# Patient Record
Sex: FEMALE | Race: BLACK OR AFRICAN AMERICAN | NOT HISPANIC OR LATINO | URBAN - METROPOLITAN AREA
[De-identification: names, ages, dates, MRNs, and addresses within clinical notes are randomized per-mention and may not be internally consistent; named-entity substitution may affect disease eponyms.]

---

## 2019-09-30 ENCOUNTER — INPATIENT (INPATIENT)
Facility: HOSPITAL | Age: 24
LOS: 4 days | Discharge: HOME | DRG: 545 | End: 2019-10-05
Attending: INTERNAL MEDICINE | Admitting: INTERNAL MEDICINE
Payer: COMMERCIAL

## 2019-09-30 VITALS
TEMPERATURE: 99 F | RESPIRATION RATE: 18 BRPM | SYSTOLIC BLOOD PRESSURE: 118 MMHG | WEIGHT: 130.95 LBS | HEIGHT: 63 IN | HEART RATE: 100 BPM | OXYGEN SATURATION: 100 % | DIASTOLIC BLOOD PRESSURE: 81 MMHG

## 2019-09-30 DIAGNOSIS — R50.9 FEVER, UNSPECIFIED: ICD-10-CM

## 2019-09-30 LAB
ALBUMIN SERPL ELPH-MCNC: 3.2 G/DL — LOW (ref 3.5–5)
ALP SERPL-CCNC: 61 U/L — SIGNIFICANT CHANGE UP (ref 40–120)
ALT FLD-CCNC: 66 U/L DA — HIGH (ref 10–60)
ANION GAP SERPL CALC-SCNC: 6 MMOL/L — SIGNIFICANT CHANGE UP (ref 5–17)
AST SERPL-CCNC: 122 U/L — HIGH (ref 10–40)
BASOPHILS # BLD AUTO: 0 K/UL — SIGNIFICANT CHANGE UP (ref 0–0.2)
BASOPHILS NFR BLD AUTO: 0 % — SIGNIFICANT CHANGE UP (ref 0–2)
BILIRUB SERPL-MCNC: 0.5 MG/DL — SIGNIFICANT CHANGE UP (ref 0.2–1.2)
BUN SERPL-MCNC: 10 MG/DL — SIGNIFICANT CHANGE UP (ref 7–18)
CALCIUM SERPL-MCNC: 8.9 MG/DL — SIGNIFICANT CHANGE UP (ref 8.4–10.5)
CHLORIDE SERPL-SCNC: 102 MMOL/L — SIGNIFICANT CHANGE UP (ref 96–108)
CO2 SERPL-SCNC: 26 MMOL/L — SIGNIFICANT CHANGE UP (ref 22–31)
CREAT SERPL-MCNC: 0.85 MG/DL — SIGNIFICANT CHANGE UP (ref 0.5–1.3)
EOSINOPHIL # BLD AUTO: 0 K/UL — SIGNIFICANT CHANGE UP (ref 0–0.5)
EOSINOPHIL NFR BLD AUTO: 0 % — SIGNIFICANT CHANGE UP (ref 0–6)
ERYTHROCYTE [SEDIMENTATION RATE] IN BLOOD: 66 MM/HR — HIGH (ref 0–15)
GLUCOSE SERPL-MCNC: 121 MG/DL — HIGH (ref 70–99)
HCG UR QL: NEGATIVE — SIGNIFICANT CHANGE UP
HCT VFR BLD CALC: 30.1 % — LOW (ref 34.5–45)
HGB BLD-MCNC: 9.8 G/DL — LOW (ref 11.5–15.5)
LYMPHOCYTES # BLD AUTO: 0.62 K/UL — LOW (ref 1–3.3)
LYMPHOCYTES # BLD AUTO: 37 % — SIGNIFICANT CHANGE UP (ref 13–44)
MCHC RBC-ENTMCNC: 28.4 PG — SIGNIFICANT CHANGE UP (ref 27–34)
MCHC RBC-ENTMCNC: 32.6 GM/DL — SIGNIFICANT CHANGE UP (ref 32–36)
MCV RBC AUTO: 87.2 FL — SIGNIFICANT CHANGE UP (ref 80–100)
MONOCYTES # BLD AUTO: 0.05 K/UL — SIGNIFICANT CHANGE UP (ref 0–0.9)
MONOCYTES NFR BLD AUTO: 3 % — SIGNIFICANT CHANGE UP (ref 2–14)
NEUTROPHILS # BLD AUTO: 0.96 K/UL — LOW (ref 1.8–7.4)
NEUTROPHILS NFR BLD AUTO: 56 % — SIGNIFICANT CHANGE UP (ref 43–77)
PLATELET # BLD AUTO: 85 K/UL — LOW (ref 150–400)
POTASSIUM SERPL-MCNC: 4.7 MMOL/L — SIGNIFICANT CHANGE UP (ref 3.5–5.3)
POTASSIUM SERPL-SCNC: 4.7 MMOL/L — SIGNIFICANT CHANGE UP (ref 3.5–5.3)
PROT SERPL-MCNC: 8.7 G/DL — HIGH (ref 6–8.3)
RBC # BLD: 3.45 M/UL — LOW (ref 3.8–5.2)
RBC # FLD: 15.9 % — HIGH (ref 10.3–14.5)
SODIUM SERPL-SCNC: 134 MMOL/L — LOW (ref 135–145)
WBC # BLD: 1.68 K/UL — LOW (ref 3.8–10.5)
WBC # FLD AUTO: 1.68 K/UL — LOW (ref 3.8–10.5)

## 2019-09-30 PROCEDURE — 99223 1ST HOSP IP/OBS HIGH 75: CPT

## 2019-09-30 PROCEDURE — 99285 EMERGENCY DEPT VISIT HI MDM: CPT

## 2019-09-30 PROCEDURE — 71046 X-RAY EXAM CHEST 2 VIEWS: CPT | Mod: 26

## 2019-09-30 PROCEDURE — 70487 CT MAXILLOFACIAL W/DYE: CPT | Mod: 26

## 2019-09-30 PROCEDURE — 70450 CT HEAD/BRAIN W/O DYE: CPT | Mod: 26

## 2019-09-30 RX ORDER — SODIUM CHLORIDE 9 MG/ML
1000 INJECTION INTRAMUSCULAR; INTRAVENOUS; SUBCUTANEOUS ONCE
Refills: 0 | Status: COMPLETED | OUTPATIENT
Start: 2019-09-30 | End: 2019-09-30

## 2019-09-30 RX ADMIN — Medication 100 MILLIGRAM(S): at 17:51

## 2019-09-30 RX ADMIN — SODIUM CHLORIDE 1000 MILLILITER(S): 9 INJECTION INTRAMUSCULAR; INTRAVENOUS; SUBCUTANEOUS at 19:57

## 2019-09-30 RX ADMIN — SODIUM CHLORIDE 1000 MILLILITER(S): 9 INJECTION INTRAMUSCULAR; INTRAVENOUS; SUBCUTANEOUS at 18:57

## 2019-09-30 RX ADMIN — Medication 600 MILLIGRAM(S): at 18:00

## 2019-09-30 NOTE — ED PROVIDER NOTE - OBJECTIVE STATEMENT
24 year old female with Lupus (takes prednisone 10 mg BID) presents to the ED with c/o headache and fever x10 days. Patient states that 5 days ago she was seen by her PCP who gave her Amoxicillin for general care but provided no relief. She then notes that four days ago she saw her dentist and did a clean out of mouth but afterwards has swelling to left side of face. No abd pain, throat pain, ear pain, rash, or any other acute complaints. NKDA.

## 2019-09-30 NOTE — H&P ADULT - HISTORY OF PRESENT ILLNESS
Pt is a  24 year old female with medical history significant for Lupus( On prednisone 10 daily), Neuropsychiatric SLE who came in with fever and headache for 10 days. Pt states that she has been having headache for last 2 weeks, generalized, 8/10 now with some blurring in vision recently. Pt was also having fever upto 104 at home and went to pmd and was started on oral antibiotic. Pt states that she was not feeling better and came to ED. Pt was diagnosed with Lupus in 2014 and was initially on Mycophenolate and hydroxychlorquine that was discontinued for unsure reasons. Pt reports that her blood numbers have been running low, and is following    Lupus (takes prednisone 10 mg BID) presents to the ED with c/o headache and fever x10 days. Patient states that 5 days ago she was seen by her PCP who gave her Amoxicillin for general care but provided no relief. She then notes that four days ago she saw her dentist and did a clean out of mouth but afterwards has swelling to left side of face. No abd pain, throat pain, ear pain, rash, or any other acute complaints. NKDA. Pt is a  24 year old female with medical history significant for Lupus( On prednisone 10 daily), Neuropsychiatric SLE who came in with fever and headache for 10 days. Pt states that she has been having headache for last 2 weeks, generalized, 8/10 now with some blurring in vision recently. Pt was also having fever upto 104 at home and went to pmd and was started on oral antibiotic. Pt states that she was not feeling better and came to ED. Pt was diagnosed with Lupus in 2014 and was initially on Mycophenolate and hydroxychlorquine that was discontinued for unsure reasons. Pt reports that her blood numbers have been running low, and has recently started following Dr Jose Alejandro Taylor. 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    Review os systems + headcahe, vision blurring, nausea, fever, facial pain, fatigue and joint pain.  Denies any chest pain, palpitations, sob, urinary symptoms or bowel symptoms. Pt was in [place] 2 weeks ago and states to have a viral like illness

## 2019-09-30 NOTE — H&P ADULT - PROBLEM SELECTOR PLAN 3
Left sided facial pain and mild swelling with recent instrumentation  No carous teeth of abscess collection/erythema noted  CT negative for abscess collection  Unsure cause of swelling

## 2019-09-30 NOTE — H&P ADULT - ASSESSMENT
Pt is a  24 year old female with medical history significant for Lupus( On prednisone 10 daily), Neuropsychiatric SLE who came in with fever and headache for 10 days. Pt states that she has been having headache for last 2 weeks, generalized, 8/10 now with some blurring in vision recently. Pt was also having fever upto 104 at home and went to pmd and was started on oral antibiotic. Pt states that she was not feeling better and came to ED. Pt was diagnosed with Lupus in 2014 and was initially on Mycophenolate and hydroxychlorquine that was discontinued for unsure reasons. Pt reports that her blood numbers have been running low, and has recently started following Dr Jose Alejandro Taylor. 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    Review os systems + headache vision blurring, nausea, fever, facial pain, fatigue and joint pain.  Denies any chest pain, palpitations, sob, urinary symptoms or bowel symptoms. Pt was in [place] 2 weeks ago and states to have a viral like illness

## 2019-09-30 NOTE — H&P ADULT - PROBLEM SELECTOR PLAN 4
Pt diagnosed with SLE in 2014   Currently on prednisone 10 mg BID  As per brother pt has non complaince issues and has been much complaint with previous treatment  Pt needs more education about her disease and importance of medication complaince  Pt and brother at bedside explained in detail

## 2019-09-30 NOTE — H&P ADULT - ATTENDING COMMENTS
Vital Signs Last 24 Hrs  T(C): 37.3 (30 Sep 2019 20:40), Max: 37.3 (30 Sep 2019 20:40)  T(F): 99.2 (30 Sep 2019 20:40), Max: 99.2 (30 Sep 2019 20:40)  HR: 89 (30 Sep 2019 20:40) (89 - 100)  BP: 102/73 (30 Sep 2019 20:40) (102/73 - 118/81)  RR: 18 (30 Sep 2019 20:40) (18 - 18)  SpO2: 100% (30 Sep 2019 20:40) (100% - 100%) 24 year woman with PMH of SLE ( suboptimally controlled) presenting with 10 days of fevers, headache, mild left facial swelling. NO other symptoms noted on ROS. There is no evidence of left facial abscess or tooth abscess on CT imaging of head and face.  No clinical presentation to suggest meningitis or lupus flare.  Preceded by a viral URI episode about 2 weeks prior    Vital Signs Last 24 Hrs  T(C): 37.3 (30 Sep 2019 20:40), Max: 37.3 (30 Sep 2019 20:40)  T(F): 99.2 (30 Sep 2019 20:40), Max: 99.2 (30 Sep 2019 20:40)  HR: 89 (30 Sep 2019 20:40) (89 - 100)  BP: 102/73 (30 Sep 2019 20:40) (102/73 - 118/81)  RR: 18 (30 Sep 2019 20:40) (18 - 18)  SpO2: 100% (30 Sep 2019 20:40) (100% - 100%)    Exam - unremarkable     Labs                        9.8    1.68  )-----------( 85       ( 30 Sep 2019 17:17 )             30.1     09-30    134  |  102  |  10  ----------------------<  121<H>  4.7   |  26  |  0.85    Ca    8.9      30 Sep 2019 17:17    TPro  8.7<H>  /  Alb  3.2<L>  /  TBili  0.5  /  DBili  x   /  AST  122<H>  /  ALT  66<H>  /  AlkPhos  61  09-30    CT M-F /head  - unremarkable   CXR- WNL    Impression  Fever of unclear etiology  - ? viral syndrome  - ? lupus flare    A/P  Admit to medicine  blood/urine cxs   Hold antibiotics and monitor  ID consult

## 2019-09-30 NOTE — H&P ADULT - PROBLEM SELECTOR PLAN 2
Pt labs signifant for pancytopenia  Pt states she follows hematologist regularly and her steroid was increased to 10BID due to low blood levels, no bone biopsy was done  Please call pt hematologist in am for records

## 2019-09-30 NOTE — H&P ADULT - NSHPPHYSICALEXAM_GEN_ALL_CORE
PHYSICAL EXAM:  GENERAL: NAD, speaks in full sentences, no signs of respiratory distress  HEAD:  Atraumatic, Normocephalic  EYES: EOMI, PERRLA, conjunctiva and sclera clear, mild swelling around left submandibular area  NECK: Supple, No JVD  CHEST/LUNG: Clear to auscultation bilaterally; No wheeze; No crackles; No accessory muscles used  HEART: Regular rate and rhythm; No murmurs;   ABDOMEN: Soft, Nontender, Nondistended; Bowel sounds present; No guarding  EXTREMITIES:  2+ Peripheral Pulses, No cyanosis or edema, small palpable mildly tender LN in left axilla  PSYCH: AAOx3  NEUROLOGY: non-focal  SKIN: No rashes or lesions

## 2019-09-30 NOTE — H&P ADULT - PROBLEM SELECTOR PLAN 1
Pt came in with fever and headache for 10 days  No Focal neurological signs/No meningeal signs noted  Physical exam and basic labs so far negative for source of infection  CT maxillofacial negative for any abscess formation, Xray negative  s/p Clindamycin in ED  F/u blood cultures  ID consult  Holding of antibiotics and f/u ID recommendations

## 2019-09-30 NOTE — ED ADULT NURSE NOTE - OBJECTIVE STATEMENT
AOX4 +ambulatory patient reports L facial swelling x 2 weeks. Patient states she was given abx and finished the course with no relief. Patient also complaining of subj fevers

## 2019-10-01 DIAGNOSIS — E07.9 DISORDER OF THYROID, UNSPECIFIED: ICD-10-CM

## 2019-10-01 DIAGNOSIS — M32.9 SYSTEMIC LUPUS ERYTHEMATOSUS, UNSPECIFIED: ICD-10-CM

## 2019-10-01 DIAGNOSIS — R50.9 FEVER, UNSPECIFIED: ICD-10-CM

## 2019-10-01 DIAGNOSIS — D61.818 OTHER PANCYTOPENIA: ICD-10-CM

## 2019-10-01 DIAGNOSIS — R51 HEADACHE: ICD-10-CM

## 2019-10-01 DIAGNOSIS — Z29.9 ENCOUNTER FOR PROPHYLACTIC MEASURES, UNSPECIFIED: ICD-10-CM

## 2019-10-01 LAB
24R-OH-CALCIDIOL SERPL-MCNC: 31.9 NG/ML — SIGNIFICANT CHANGE UP (ref 30–80)
ALBUMIN SERPL ELPH-MCNC: 2.8 G/DL — LOW (ref 3.5–5)
ALP SERPL-CCNC: 56 U/L — SIGNIFICANT CHANGE UP (ref 40–120)
ALT FLD-CCNC: 53 U/L DA — SIGNIFICANT CHANGE UP (ref 10–60)
ANION GAP SERPL CALC-SCNC: 7 MMOL/L — SIGNIFICANT CHANGE UP (ref 5–17)
APPEARANCE UR: CLEAR — SIGNIFICANT CHANGE UP
AST SERPL-CCNC: 91 U/L — HIGH (ref 10–40)
BASOPHILS # BLD AUTO: SIGNIFICANT CHANGE UP (ref 0–0.2)
BASOPHILS NFR BLD AUTO: SIGNIFICANT CHANGE UP (ref 0–2)
BILIRUB SERPL-MCNC: 0.4 MG/DL — SIGNIFICANT CHANGE UP (ref 0.2–1.2)
BILIRUB UR-MCNC: NEGATIVE — SIGNIFICANT CHANGE UP
BUN SERPL-MCNC: 8 MG/DL — SIGNIFICANT CHANGE UP (ref 7–18)
C3 SERPL-MCNC: 74 MG/DL — LOW (ref 81–157)
C4 SERPL-MCNC: 17 MG/DL — SIGNIFICANT CHANGE UP (ref 13–39)
CALCIUM SERPL-MCNC: 8.6 MG/DL — SIGNIFICANT CHANGE UP (ref 8.4–10.5)
CHLORIDE SERPL-SCNC: 101 MMOL/L — SIGNIFICANT CHANGE UP (ref 96–108)
CO2 SERPL-SCNC: 27 MMOL/L — SIGNIFICANT CHANGE UP (ref 22–31)
COLOR SPEC: YELLOW — SIGNIFICANT CHANGE UP
CREAT SERPL-MCNC: 0.73 MG/DL — SIGNIFICANT CHANGE UP (ref 0.5–1.3)
DIFF PNL FLD: NEGATIVE — SIGNIFICANT CHANGE UP
EOSINOPHIL # BLD AUTO: SIGNIFICANT CHANGE UP (ref 0–0.5)
EOSINOPHIL NFR BLD AUTO: SIGNIFICANT CHANGE UP (ref 0–6)
FLU A RESULT: SIGNIFICANT CHANGE UP
FLU A RESULT: SIGNIFICANT CHANGE UP
FLUAV AG NPH QL: SIGNIFICANT CHANGE UP
FLUBV AG NPH QL: SIGNIFICANT CHANGE UP
GLUCOSE SERPL-MCNC: 106 MG/DL — HIGH (ref 70–99)
GLUCOSE UR QL: NEGATIVE — SIGNIFICANT CHANGE UP
HCT VFR BLD CALC: 28.8 % — LOW (ref 34.5–45)
HGB BLD-MCNC: 9.2 G/DL — LOW (ref 11.5–15.5)
IMM GRANULOCYTES NFR BLD AUTO: SIGNIFICANT CHANGE UP (ref 0–1.5)
KETONES UR-MCNC: NEGATIVE — SIGNIFICANT CHANGE UP
LEGIONELLA AG UR QL: NEGATIVE — SIGNIFICANT CHANGE UP
LEUKOCYTE ESTERASE UR-ACNC: NEGATIVE — SIGNIFICANT CHANGE UP
LYMPHOCYTES # BLD AUTO: SIGNIFICANT CHANGE UP (ref 13–44)
LYMPHOCYTES # BLD AUTO: SIGNIFICANT CHANGE UP (ref 1–3.3)
MAGNESIUM SERPL-MCNC: 1.9 MG/DL — SIGNIFICANT CHANGE UP (ref 1.6–2.6)
MCHC RBC-ENTMCNC: 27.6 PG — SIGNIFICANT CHANGE UP (ref 27–34)
MCHC RBC-ENTMCNC: 31.9 GM/DL — LOW (ref 32–36)
MCV RBC AUTO: 86.5 FL — SIGNIFICANT CHANGE UP (ref 80–100)
MONOCYTES # BLD AUTO: SIGNIFICANT CHANGE UP (ref 0–0.9)
MONOCYTES NFR BLD AUTO: SIGNIFICANT CHANGE UP (ref 2–14)
NEUTROPHILS # BLD AUTO: SIGNIFICANT CHANGE UP (ref 1.8–7.4)
NEUTROPHILS NFR BLD AUTO: SIGNIFICANT CHANGE UP (ref 43–77)
NITRITE UR-MCNC: NEGATIVE — SIGNIFICANT CHANGE UP
NRBC # BLD: 0 /100 WBCS — SIGNIFICANT CHANGE UP (ref 0–0)
PH UR: 7 — SIGNIFICANT CHANGE UP (ref 5–8)
PHOSPHATE SERPL-MCNC: 3.5 MG/DL — SIGNIFICANT CHANGE UP (ref 2.5–4.5)
PLATELET # BLD AUTO: 79 K/UL — LOW (ref 150–400)
POTASSIUM SERPL-MCNC: 3.3 MMOL/L — LOW (ref 3.5–5.3)
POTASSIUM SERPL-SCNC: 3.3 MMOL/L — LOW (ref 3.5–5.3)
PROT SERPL-MCNC: 7.5 G/DL — SIGNIFICANT CHANGE UP (ref 6–8.3)
PROT UR-MCNC: NEGATIVE — SIGNIFICANT CHANGE UP
RBC # BLD: 3.33 M/UL — LOW (ref 3.8–5.2)
RBC # FLD: 15.7 % — HIGH (ref 10.3–14.5)
RSV RESULT: SIGNIFICANT CHANGE UP
RSV RNA RESP QL NAA+PROBE: SIGNIFICANT CHANGE UP
SODIUM SERPL-SCNC: 135 MMOL/L — SIGNIFICANT CHANGE UP (ref 135–145)
SP GR SPEC: 1.01 — SIGNIFICANT CHANGE UP (ref 1.01–1.02)
TSH SERPL-MCNC: 12.7 UU/ML — HIGH (ref 0.34–4.82)
UROBILINOGEN FLD QL: NEGATIVE — SIGNIFICANT CHANGE UP
VIT B12 SERPL-MCNC: 419 PG/ML — SIGNIFICANT CHANGE UP (ref 232–1245)
WBC # BLD: 1.72 K/UL — LOW (ref 3.8–10.5)
WBC # FLD AUTO: 1.72 K/UL — LOW (ref 3.8–10.5)

## 2019-10-01 PROCEDURE — 99232 SBSQ HOSP IP/OBS MODERATE 35: CPT | Mod: GC

## 2019-10-01 PROCEDURE — 70552 MRI BRAIN STEM W/DYE: CPT | Mod: 26

## 2019-10-01 RX ORDER — LANOLIN ALCOHOL/MO/W.PET/CERES
3 CREAM (GRAM) TOPICAL AT BEDTIME
Refills: 0 | Status: COMPLETED | OUTPATIENT
Start: 2019-10-01 | End: 2019-10-01

## 2019-10-01 RX ORDER — INFLUENZA VIRUS VACCINE 15; 15; 15; 15 UG/.5ML; UG/.5ML; UG/.5ML; UG/.5ML
0.5 SUSPENSION INTRAMUSCULAR ONCE
Refills: 0 | Status: DISCONTINUED | OUTPATIENT
Start: 2019-10-01 | End: 2019-10-05

## 2019-10-01 RX ORDER — ACETAMINOPHEN 500 MG
650 TABLET ORAL EVERY 6 HOURS
Refills: 0 | Status: DISCONTINUED | OUTPATIENT
Start: 2019-10-01 | End: 2019-10-05

## 2019-10-01 RX ORDER — CEFTRIAXONE 500 MG/1
1000 INJECTION, POWDER, FOR SOLUTION INTRAMUSCULAR; INTRAVENOUS EVERY 24 HOURS
Refills: 0 | Status: DISCONTINUED | OUTPATIENT
Start: 2019-10-01 | End: 2019-10-05

## 2019-10-01 RX ORDER — POTASSIUM CHLORIDE 20 MEQ
40 PACKET (EA) ORAL ONCE
Refills: 0 | Status: COMPLETED | OUTPATIENT
Start: 2019-10-01 | End: 2019-10-01

## 2019-10-01 RX ORDER — ZOLPIDEM TARTRATE 10 MG/1
5 TABLET ORAL AT BEDTIME
Refills: 0 | Status: DISCONTINUED | OUTPATIENT
Start: 2019-10-01 | End: 2019-10-01

## 2019-10-01 RX ADMIN — Medication 10 MILLIGRAM(S): at 17:32

## 2019-10-01 RX ADMIN — CEFTRIAXONE 100 MILLIGRAM(S): 500 INJECTION, POWDER, FOR SOLUTION INTRAMUSCULAR; INTRAVENOUS at 17:34

## 2019-10-01 RX ADMIN — ZOLPIDEM TARTRATE 5 MILLIGRAM(S): 10 TABLET ORAL at 23:15

## 2019-10-01 RX ADMIN — Medication 10 MILLIGRAM(S): at 11:19

## 2019-10-01 RX ADMIN — Medication 40 MILLIEQUIVALENT(S): at 11:19

## 2019-10-01 RX ADMIN — Medication 650 MILLIGRAM(S): at 04:13

## 2019-10-01 RX ADMIN — Medication 3 MILLIGRAM(S): at 21:44

## 2019-10-01 RX ADMIN — Medication 650 MILLIGRAM(S): at 14:11

## 2019-10-01 RX ADMIN — Medication 650 MILLIGRAM(S): at 02:34

## 2019-10-01 NOTE — PROGRESS NOTE ADULT - PROBLEM SELECTOR PLAN 3
Pt diagnosed with SLE in 2014   Denies history of seziures  Currently on prednisone 10 mg BID  Quit taking immunosuppressive therapy several months ago Pt diagnosed with SLE in 2014   Denies history of seizures  Currently on prednisone 10 mg BID  Quit taking immunosuppressive therapy several months ago Pt diagnosed with SLE in 2014   Seizure history 2 years ago, not on AEDs  Currently on prednisone 10 mg BID  Quit taking immunosuppressive therapy several months ago Pt diagnosed with SLE in 2014   Seizure history 2 years ago, not on AEDs  Currently on prednisone 10 mg BID  Quit taking immunosuppressive therapy several months ago  Ordered MR head to r/o cerebritis

## 2019-10-01 NOTE — CONSULT NOTE ADULT - SUBJECTIVE AND OBJECTIVE BOX
HPI:  ID consult was called to evaluate this patient for fevers and headaches, history of SLE.     As per H&p:  Pt is a  24 year old female with medical history significant for Lupus( On prednisone 10 daily), Neuropsychiatric SLE who came in with fever and headache for 10 days. Pt states that she has been having headache for last 2 weeks, generalized, 8/10 now with some blurring in vision recently. Pt was also having fever upto 104 at home and went to pmd and was started on oral antibiotic. Pt states that she was not feeling better and came to ED. Pt was diagnosed with Lupus in 2014 and was initially on Mycophenolate and hydroxychlorquine that was discontinued for unsure reasons. Pt reports that her blood numbers have been running low, and has recently started following Dr Jose Alejandro Taylor. 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 Review os systems + headcahe, vision blurring, nausea, fever, facial pain, fatigue and joint pain.  Denies any chest pain, palpitations, sob, urinary symptoms or bowel symptoms. Pt was in [place] 2 weeks ago and states to have a viral like illness (30 Sep 2019 23:27)    REVIEW OF SYSTEMS:  [  ] Not able to illicit  General:	  Chest:	  GI:	  :  Skin:	  Musculoskeletal:	  Neuro:    PAST MEDICAL & SURGICAL HISTORY:  Systemic lupus erythematosus    ALLERGIES: No Known Allergies    MEDS:  acetaminophen   Tablet .. 650 milliGRAM(s) Oral every 6 hours PRN  influenza   Vaccine 0.5 milliLiter(s) IntraMuscular once  potassium chloride    Tablet ER 40 milliEquivalent(s) Oral once  predniSONE   Tablet 10 milliGRAM(s) Oral two times a day    SOCIAL HISTORY:  Smoker:  none    FAMILY HISTORY: noncontributory    VITALS:  Vital Signs Last 24 Hrs  T(C): 37.4 (01 Oct 2019 05:54), Max: 38 (01 Oct 2019 02:33)  T(F): 99.4 (01 Oct 2019 05:54), Max: 100.4 (01 Oct 2019 02:33)  HR: 92 (01 Oct 2019 05:54) (89 - 100)  BP: 102/66 (01 Oct 2019 05:54) (100/63 - 118/81)  BP(mean): --  RR: 16 (01 Oct 2019 05:54) (16 - 18)  SpO2: 100% (01 Oct 2019 05:54) (100% - 100%)      PHYSICAL EXAM:  Constitutional:  HEENT:  Neck:  Respiratory:  Cardiovascular:  Gastrointestinal:  Extremities:  Skin:  Ortho:  Neuro:      LABS/DIAGNOSTIC TESTS:                        9.2    1.72  )-----------( 79       ( 01 Oct 2019 08:20 )             28.8     WBC Count: 1.72 K/uL (10-01 @ 08:20)  WBC Count: 1.68 K/uL (09-30 @ 17:17)    10-01    135  |  101  |  8   ----------------------------<  106<H>  3.3<L>   |  27  |  0.73    Ca    8.6      01 Oct 2019 08:20  Phos  3.5     10-01  Mg     1.9     10-01    TPro  7.5  /  Alb  2.8<L>  /  TBili  0.4  /  DBili  x   /  AST  91<H>  /  ALT  53  /  AlkPhos  56  10-01      LIVER FUNCTIONS - ( 01 Oct 2019 08:20 )  Alb: 2.8 g/dL / Pro: 7.5 g/dL / ALK PHOS: 56 U/L / ALT: 53 U/L DA / AST: 91 U/L / GGT: x           Sedimentation Rate, Erythrocyte: 66 mm/Hr (09.30.19 @ 17:17)    Pregnancy Profile, Urine: Negative: There is potential for a false-negative result for very early pregnancies  or with gestational age 5-7 weeks or above. The quantitative measurement  of serum hCG provides the most sensitive and accurate assessment of  pregnancy status. (09.30.19 @ 17:17)        CULTURES:   9/30 BC - pending       RADIOLOGY:  EXAM:  CT MAXILLOFACIAL  IC                            PROCEDURE DATE:  09/30/2019          INTERPRETATION:  CLINICAL INFORMATION: fever headache. . .    TECHNIQUE: Contrast-enhanced CT scan of the maxillofacial bones was   performed. 90 mL Omnipaque 350 intravenous contrast was administered.   Thin section axial images with sagittal and coronal reformations were   obtained.    COMPARISON: None.    FINDINGS:    No rim-enhancing fluid collection.    No acute facial bone fractures are visualized.    The visualized portions of the paranasal sinuses are well aerated.    The visualized orbits are within normal limits.     The visualized calvarium, intracranial contents and skull base are   unremarkable. The soft tissues of the visualized aerodigestive tract are   grossly unremarkable.    IMPRESSION:     No evidence of abscess.          EXAM:  XR CHEST PA LAT 2V                            PROCEDURE DATE:  09/30/2019          INTERPRETATION:  CLINICAL STATEMENT: Fever for 2 weeks    TECHNIQUE: Frontal and lateral views of the chest.    COMPARISON: None.     The cardiomediastinal silhouette is normal and the edward are not enlarged.   The trachea is midline. There is no focal infiltrate or pleural effusion.   The osseous structures are intact.    IMPRESSION:    Normal chest HPI:  ID consult was called to evaluate this patient for fevers and headaches, history of SLE. SLE was diagnosed in 2014, recently started seeing Dr. Blount for rheum. Admits H/As have improved, but still have lingering whitish productive cough. Had low grade fever of 100.4F overnight. Will need MRI of brain and started rocephin.     As per H&p:  Pt is a  24 year old female with medical history significant for Lupus( On prednisone 10 daily), Neuropsychiatric SLE who came in with fever and headache for 10 days. Pt states that she has been having headache for last 2 weeks, generalized, 8/10 now with some blurring in vision recently. Pt was also having fever upto 104 at home and went to pmd and was started on oral antibiotic. Pt states that she was not feeling better and came to ED. Pt was diagnosed with Lupus in 2014 and was initially on Mycophenolate and hydroxychlorquine that was discontinued for unsure reasons. Pt reports that her blood numbers have been running low, and has recently started following Dr Jose Alejandro Taylor. 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 Review os systems + headcahe, vision blurring, nausea, fever, facial pain, fatigue and joint pain.  Denies any chest pain, palpitations, sob, urinary symptoms or bowel symptoms. Pt was in [place] 2 weeks ago and states to have a viral like illness (30 Sep 2019 23:27)    REVIEW OF SYSTEMS:  [  ] Not able to illicit  General: +fevers no malaise   Chest: +cough no sob no CP  GI: no nvd no abdominal pain  : no urinary symptoms   Skin: no rashes no cyanosis  Musculoskeletal: no trauma no LBP  Neuro: +improved ha's no dizziness     PAST MEDICAL & SURGICAL HISTORY:  Systemic lupus erythematosus    ALLERGIES: No Known Allergies    MEDS:  acetaminophen   Tablet .. 650 milliGRAM(s) Oral every 6 hours PRN  influenza   Vaccine 0.5 milliLiter(s) IntraMuscular once  potassium chloride    Tablet ER 40 milliEquivalent(s) Oral once  predniSONE   Tablet 10 milliGRAM(s) Oral two times a day    SOCIAL HISTORY:  Smoker:  none    FAMILY HISTORY: noncontributory    VITALS:  Vital Signs Last 24 Hrs  T(C): 37.4 (01 Oct 2019 05:54), Max: 38 (01 Oct 2019 02:33)  T(F): 99.4 (01 Oct 2019 05:54), Max: 100.4 (01 Oct 2019 02:33)  HR: 92 (01 Oct 2019 05:54) (89 - 100)  BP: 102/66 (01 Oct 2019 05:54) (100/63 - 118/81)  BP(mean): --  RR: 16 (01 Oct 2019 05:54) (16 - 18)  SpO2: 100% (01 Oct 2019 05:54) (100% - 100%)      PHYSICAL EXAM:  Constitutional: well developed young female in NAD  HEENT: normocephalic with moist oral mucosa  Neck: supple no LN's no JVD  Respiratory: lungs clear no rales no rhonchi  Cardiovascular: S1 S2 reg no murmurs  Gastrointestinal: +BS with soft, nondistended abdomen; nontender  Extremities: no edema no cyanosis  Skin: no rashes  Ortho: no jt swelling  Neuro: AAO x 3      LABS/DIAGNOSTIC TESTS:                        9.2    1.72  )-----------( 79       ( 01 Oct 2019 08:20 )             28.8     WBC Count: 1.72 K/uL (10-01 @ 08:20)  WBC Count: 1.68 K/uL (09-30 @ 17:17)    10-01    135  |  101  |  8   ----------------------------<  106<H>  3.3<L>   |  27  |  0.73    Ca    8.6      01 Oct 2019 08:20  Phos  3.5     10-01  Mg     1.9     10-01    TPro  7.5  /  Alb  2.8<L>  /  TBili  0.4  /  DBili  x   /  AST  91<H>  /  ALT  53  /  AlkPhos  56  10-01      LIVER FUNCTIONS - ( 01 Oct 2019 08:20 )  Alb: 2.8 g/dL / Pro: 7.5 g/dL / ALK PHOS: 56 U/L / ALT: 53 U/L DA / AST: 91 U/L / GGT: x           Sedimentation Rate, Erythrocyte: 66 mm/Hr (09.30.19 @ 17:17)    Pregnancy Profile, Urine: Negative: There is potential for a false-negative result for very early pregnancies  or with gestational age 5-7 weeks or above. The quantitative measurement  of serum hCG provides the most sensitive and accurate assessment of  pregnancy status. (09.30.19 @ 17:17)    legionella urine - pending     CULTURES:   9/30 BC - pending   10/01 UC - pending       RADIOLOGY:  EXAM:  CT MAXILLOFACIAL  IC                            PROCEDURE DATE:  09/30/2019          INTERPRETATION:  CLINICAL INFORMATION: fever headache. . .    TECHNIQUE: Contrast-enhanced CT scan of the maxillofacial bones was   performed. 90 mL Omnipaque 350 intravenous contrast was administered.   Thin section axial images with sagittal and coronal reformations were   obtained.    COMPARISON: None.    FINDINGS:    No rim-enhancing fluid collection.    No acute facial bone fractures are visualized.    The visualized portions of the paranasal sinuses are well aerated.    The visualized orbits are within normal limits.     The visualized calvarium, intracranial contents and skull base are   unremarkable. The soft tissues of the visualized aerodigestive tract are   grossly unremarkable.    IMPRESSION:     No evidence of abscess.          EXAM:  XR CHEST PA LAT 2V                            PROCEDURE DATE:  09/30/2019          INTERPRETATION:  CLINICAL STATEMENT: Fever for 2 weeks    TECHNIQUE: Frontal and lateral views of the chest.    COMPARISON: None.     The cardiomediastinal silhouette is normal and the edward are not enlarged.   The trachea is midline. There is no focal infiltrate or pleural effusion.   The osseous structures are intact.    IMPRESSION:    Normal chest

## 2019-10-01 NOTE — PROGRESS NOTE ADULT - PROBLEM SELECTOR PLAN 1
Fever for 2 weeks after recent travel from [place]  Sick contacts exposure   Pt also had dental procedure done one week ago and has been on Augmentin   Mild associated headache and dry cough  F/u Complement levels, HIV and dsDNA for r/o cerebritis   Pending blood cultures and UA Fever for 2 weeks after recent travel from [place]  Sick contacts exposure   Pt also had dental procedure done one week ago and has been on Augmentin   Mild associated headache and dry cough  No meningeal signs   F/u Complement levels, HIV and dsDNA for r/o cerebritis   Pending blood cultures and UA Fever for 2 weeks after recent travel from [place]  Sick contacts exposure   Pt also had dental procedure done one week ago and has been on Augmentin   Mild associated headache and dry cough  No meningeal signs   F/u Complement levels, HIV and dsDNA for r/o cerebritis   Pending blood cultures and UA  Empiric Ceftriaxone until then

## 2019-10-01 NOTE — CONSULT NOTE ADULT - SUBJECTIVE AND OBJECTIVE BOX
Patient is a 24y old  Female who presents with a chief complaint of Fever, headache and facial swelling (01 Oct 2019 09:47)      HPI:  Pt is a  24 year old female with medical history significant for Lupus( On prednisone 10 daily), Neuropsychiatric SLE who came in with fever and headache for 10 days. Pt states that she has been having headache for last 2 weeks, generalized, 8/10 now with some blurring in vision recently. Pt was also having fever upto 104 at home and went to pmd and was started on oral antibiotic. Pt states that she was not feeling better and came to ED. Pt was diagnosed with Lupus in 2014 and was initially on Mycophenolate and hydroxychlorquine that was discontinued for unsure reasons. Pt reports that her blood numbers have been running low, and has recently started following Dr Jose Alejandro Taylor. 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    Review os systems + headcahe, vision blurring, nausea, fever, facial pain, fatigue and joint pain.  Denies any chest pain, palpitations, sob, urinary symptoms or bowel symptoms. Pt was in [place] 2 weeks ago and states to have a viral like illness (30 Sep 2019 23:27)  Her baseline WBC 3-4 and Hb 9.5, platelet 150.       ROS:  Negative except for:    PAST MEDICAL & SURGICAL HISTORY:  Systemic lupus erythematosus      SOCIAL HISTORY:    FAMILY HISTORY:      MEDICATIONS  (STANDING):  cefTRIAXone   IVPB 1000 milliGRAM(s) IV Intermittent every 24 hours  influenza   Vaccine 0.5 milliLiter(s) IntraMuscular once  predniSONE   Tablet 10 milliGRAM(s) Oral two times a day    MEDICATIONS  (PRN):  acetaminophen   Tablet .. 650 milliGRAM(s) Oral every 6 hours PRN Temp greater or equal to 38C (100.4F), Mild Pain (1 - 3)      Allergies    No Known Allergies    Intolerances        Vital Signs Last 24 Hrs  T(C): 37.5 (01 Oct 2019 20:15), Max: 39.3 (01 Oct 2019 13:50)  T(F): 99.5 (01 Oct 2019 20:15), Max: 102.7 (01 Oct 2019 13:50)  HR: 91 (01 Oct 2019 20:15) (91 - 106)  BP: 97/68 (01 Oct 2019 20:15) (90/50 - 112/70)  BP(mean): --  RR: 16 (01 Oct 2019 20:15) (16 - 17)  SpO2: 100% (01 Oct 2019 20:15) (99% - 100%)    PHYSICAL EXAM  General: adult in NAD  HEENT: clear oropharynx, anicteric sclera, pink conjunctiva  Neck: supple  CV: normal S1/S2 with no murmur rubs or gallops  Lungs: positive air movement b/l ant lungs,clear to auscultation, no wheezes, no rales  Abdomen: soft non-tender non-distended, no hepatosplenomegaly  Ext: no clubbing cyanosis or edema  Skin: no rashes and no petechiae  Neuro: alert and oriented X 4, no focal deficits      LABS:                          9.2    1.72  )-----------( 79       ( 01 Oct 2019 08:20 )             28.8         Mean Cell Volume : 86.5 fl  Mean Cell Hemoglobin : 27.6 pg  Mean Cell Hemoglobin Concentration : 31.9 gm/dL  Auto Neutrophil # : See note  Auto Lymphocyte # : See note  Auto Monocyte # : See note  Auto Eosinophil # : See note  Auto Basophil # : See note  Auto Neutrophil % : See note  Auto Lymphocyte % : See note  Auto Monocyte % : See note  Auto Eosinophil % : See note  Auto Basophil % : See note      Serial CBC's  10-01 @ 08:20  Hct-28.8 / Hgb-9.2 / Plat-79 / RBC-3.33 / WBC-1.72  Serial CBC's  09-30 @ 17:17  Hct-30.1 / Hgb-9.8 / Plat-85 / RBC-3.45 / WBC-1.68      10-01    135  |  101  |  8   ----------------------------<  106<H>  3.3<L>   |  27  |  0.73    Ca    8.6      01 Oct 2019 08:20  Phos  3.5     10-01  Mg     1.9     10-01    TPro  7.5  /  Alb  2.8<L>  /  TBili  0.4  /  DBili  x   /  AST  91<H>  /  ALT  53  /  AlkPhos  56  10-01          Vitamin B12, Serum: 419 pg/mL (10-01 @ 14:59)              BLOOD SMEAR INTERPRETATION:       RADIOLOGY & ADDITIONAL STUDIES:    < from: MR Head w/ IV Cont (10.01.19 @ 17:57) >  INTERPRETATION:  MRI brain with and without contrast    History seizures, lupus, psychosis    Contrast Gadavist 7 cc    Comparison CT performed the prior day    There is no mass effect, cortical edema or hydrocephalus. Cortical volume   and white matter signal are preserved. There is no evidence of acute   infarct or previous parenchymal hemorrhage. The orbital and sellar   contents and cerebellar tonsils are within normal limits. There is normal   physiologic enhancement.    IMPRESSION:    Normal brain    < end of copied text >  < from: Xray Chest 2 Views PA/Lat (09.30.19 @ 17:49) >  INTERPRETATION:  CLINICAL STATEMENT: Fever for 2 weeks    TECHNIQUE: Frontal and lateral views of the chest.    COMPARISON: None.     The cardiomediastinal silhouette is normal and the edward are not enlarged.   The trachea is midline. There is no focal infiltrate or pleural effusion.   The osseous structures are intact.    IMPRESSION:    Normal chest    < end of copied text >

## 2019-10-01 NOTE — ED ADULT NURSE REASSESSMENT NOTE - NS ED NURSE REASSESS COMMENT FT1
210 am - informed and asked pt for urine sample  but pt states she can not give sample at this time but will give once specimen ready.

## 2019-10-01 NOTE — PROGRESS NOTE ADULT - SUBJECTIVE AND OBJECTIVE BOX
PGY 3 Note discussed with primary attending    Patient is a 24y old  Female who presents with a chief complaint of Fever, headache and facial swelling (30 Sep 2019 23:27)      INTERVAL HPI/OVERNIGHT EVENTS: offers no new complaints; current symptoms resolving    MEDICATIONS  (STANDING):  influenza   Vaccine 0.5 milliLiter(s) IntraMuscular once  potassium chloride    Tablet ER 40 milliEquivalent(s) Oral once    MEDICATIONS  (PRN):  acetaminophen   Tablet .. 650 milliGRAM(s) Oral every 6 hours PRN Temp greater or equal to 38C (100.4F), Mild Pain (1 - 3)      __________________________________________________  Vital Signs Last 24 Hrs  T(C): 37.4 (01 Oct 2019 05:54), Max: 38 (01 Oct 2019 02:33)  T(F): 99.4 (01 Oct 2019 05:54), Max: 100.4 (01 Oct 2019 02:33)  HR: 92 (01 Oct 2019 05:54) (89 - 100)  BP: 102/66 (01 Oct 2019 05:54) (100/63 - 118/81)  BP(mean): --  RR: 16 (01 Oct 2019 05:54) (16 - 18)  SpO2: 100% (01 Oct 2019 05:54) (100% - 100%)    ________________________________________________  PHYSICAL EXAM:  GENERAL: NAD Young  female   HEENT: Normocephalic;  conjunctivae and sclerae clear; left molar inflammation    NECK : supple  CHEST/LUNG: Clear to auscultation bilaterally with good air entry   HEART: S1 S2  regular; no murmurs, gallops or rubs  ABDOMEN: Soft, Nontender, Nondistended; Bowel sounds present  EXTREMITIES: no cyanosis; no edema; no calf tenderness, tattoos  SKIN: warm and dry; no rash  NERVOUS SYSTEM:  Awake and alert x 3    _________________________________________________  LABS:                        9.2    1.72  )-----------( 79       ( 01 Oct 2019 08:20 )             28.8     10-01    135  |  101  |  8   ----------------------------<  106<H>  3.3<L>   |  27  |  0.73    Ca    8.6      01 Oct 2019 08:20  Phos  3.5     10-01  Mg     1.9     10-01    TPro  7.5  /  Alb  2.8<L>  /  TBili  0.4  /  DBili  x   /  AST  91<H>  /  ALT  53  /  AlkPhos  56  10-01        Plan of care was discussed with patient and /or primary care giver; all questions and concerns were addressed and care was aligned with patient's wishes.

## 2019-10-01 NOTE — CONSULT NOTE ADULT - ASSESSMENT
24 year old lady with known lupus on prednisone 10 mg a day.  she presented with high fever, headache for 2 weeks.  She has some cough, no sore throat, dysuria.  She has pancytopenia now with much lower WBC and platelet.

## 2019-10-01 NOTE — CONSULT NOTE ADULT - ASSESSMENT
Fevers  Pancytopenia  H/o Lupus 1.	Fevers - r/o lupus cerebritis   2.	Pancytopenia  ·	awaiting culture results   ·	started on rocephin 1gm IV daily  ·	ordered DAVID, RF and c-reactive protein  ·	will need MRI brain with contrast

## 2019-10-01 NOTE — PROGRESS NOTE ADULT - PROBLEM SELECTOR PLAN 2
History of chronic pancytopenia   ANC of 950  Pt states she follows hematologist regularly and her steroid was increased to 10BID due to low blood levels, no bone biopsy was done History of chronic pancytopenia   ANC of 950  She saw Dr Dailey one week ago outpatient

## 2019-10-01 NOTE — PROGRESS NOTE ADULT - ASSESSMENT
Pt is a  24 year old female with medical history significant for pancytopenia, Lupus (On prednisone 10 daily), Neuropsychiatric SLE who came in with fever and headache for 10 days.

## 2019-10-01 NOTE — CONSULT NOTE ADULT - PROBLEM SELECTOR RECOMMENDATION 9
no nutritional def as all study was done in office  the drop of counts probably from either infection or SLE  infection is less likley because she has fever and headache for 2 weeks and she looks stable

## 2019-10-02 LAB
ALBUMIN SERPL ELPH-MCNC: 3.2 G/DL — LOW (ref 3.5–5)
ALP SERPL-CCNC: 60 U/L — SIGNIFICANT CHANGE UP (ref 40–120)
ALT FLD-CCNC: 59 U/L DA — SIGNIFICANT CHANGE UP (ref 10–60)
ANION GAP SERPL CALC-SCNC: 9 MMOL/L — SIGNIFICANT CHANGE UP (ref 5–17)
APPEARANCE CSF: CLEAR — SIGNIFICANT CHANGE UP
APPEARANCE SPUN FLD: COLORLESS — SIGNIFICANT CHANGE UP
APTT BLD: 28.2 SEC — SIGNIFICANT CHANGE UP (ref 27.5–36.3)
AST SERPL-CCNC: 93 U/L — HIGH (ref 10–40)
BASOPHILS # BLD AUTO: 0 K/UL — SIGNIFICANT CHANGE UP (ref 0–0.2)
BASOPHILS NFR BLD AUTO: 0 % — SIGNIFICANT CHANGE UP (ref 0–2)
BILIRUB SERPL-MCNC: 0.4 MG/DL — SIGNIFICANT CHANGE UP (ref 0.2–1.2)
BUN SERPL-MCNC: 10 MG/DL — SIGNIFICANT CHANGE UP (ref 7–18)
CALCIUM SERPL-MCNC: 9 MG/DL — SIGNIFICANT CHANGE UP (ref 8.4–10.5)
CHLORIDE SERPL-SCNC: 102 MMOL/L — SIGNIFICANT CHANGE UP (ref 96–108)
CO2 SERPL-SCNC: 26 MMOL/L — SIGNIFICANT CHANGE UP (ref 22–31)
COLOR CSF: SIGNIFICANT CHANGE UP
CREAT SERPL-MCNC: 0.76 MG/DL — SIGNIFICANT CHANGE UP (ref 0.5–1.3)
CRP SERPL-MCNC: 1.19 MG/DL — HIGH (ref 0–0.4)
CSF COMMENTS: SIGNIFICANT CHANGE UP
CULTURE RESULTS: SIGNIFICANT CHANGE UP
DSDNA AB SER-ACNC: 24 IU/ML — SIGNIFICANT CHANGE UP
EOSINOPHIL # BLD AUTO: 0 K/UL — SIGNIFICANT CHANGE UP (ref 0–0.5)
EOSINOPHIL NFR BLD AUTO: 0 % — SIGNIFICANT CHANGE UP (ref 0–6)
GLUCOSE CSF-MCNC: 54 MG/DL — SIGNIFICANT CHANGE UP (ref 40–70)
GLUCOSE SERPL-MCNC: 88 MG/DL — SIGNIFICANT CHANGE UP (ref 70–99)
HAV IGM SER-ACNC: SIGNIFICANT CHANGE UP
HBV CORE IGM SER-ACNC: SIGNIFICANT CHANGE UP
HBV SURFACE AG SER-ACNC: SIGNIFICANT CHANGE UP
HCT VFR BLD CALC: 30.4 % — LOW (ref 34.5–45)
HCV AB S/CO SERPL IA: 0.15 S/CO — SIGNIFICANT CHANGE UP (ref 0–0.99)
HCV AB SERPL-IMP: SIGNIFICANT CHANGE UP
HGB BLD-MCNC: 9.7 G/DL — LOW (ref 11.5–15.5)
HIV 1+2 AB+HIV1 P24 AG SERPL QL IA: SIGNIFICANT CHANGE UP
INR BLD: 0.99 RATIO — SIGNIFICANT CHANGE UP (ref 0.88–1.16)
LYMPHOCYTES # BLD AUTO: 0.73 K/UL — LOW (ref 1–3.3)
LYMPHOCYTES # BLD AUTO: 46 % — HIGH (ref 13–44)
LYMPHOCYTES # CSF: 100 % — HIGH (ref 40–80)
MAGNESIUM SERPL-MCNC: 2.2 MG/DL — SIGNIFICANT CHANGE UP (ref 1.6–2.6)
MCHC RBC-ENTMCNC: 27.2 PG — SIGNIFICANT CHANGE UP (ref 27–34)
MCHC RBC-ENTMCNC: 31.9 GM/DL — LOW (ref 32–36)
MCV RBC AUTO: 85.4 FL — SIGNIFICANT CHANGE UP (ref 80–100)
MONOCYTES # BLD AUTO: 0.13 K/UL — SIGNIFICANT CHANGE UP (ref 0–0.9)
MONOCYTES NFR BLD AUTO: 8 % — SIGNIFICANT CHANGE UP (ref 2–14)
MONOS+MACROS NFR CSF: 0 % — LOW (ref 15–45)
NEUTROPHILS # BLD AUTO: 0.73 K/UL — LOW (ref 1.8–7.4)
NEUTROPHILS # CSF: 0 % — SIGNIFICANT CHANGE UP (ref 0–6)
NEUTROPHILS NFR BLD AUTO: 46 % — SIGNIFICANT CHANGE UP (ref 43–77)
NRBC NFR CSF: 0 % — SIGNIFICANT CHANGE UP (ref 0–0)
NRBC NFR CSF: 2 /UL — SIGNIFICANT CHANGE UP (ref 0–5)
PHOSPHATE SERPL-MCNC: 4.7 MG/DL — HIGH (ref 2.5–4.5)
PLATELET # BLD AUTO: 115 K/UL — LOW (ref 150–400)
POTASSIUM SERPL-MCNC: 3.6 MMOL/L — SIGNIFICANT CHANGE UP (ref 3.5–5.3)
POTASSIUM SERPL-SCNC: 3.6 MMOL/L — SIGNIFICANT CHANGE UP (ref 3.5–5.3)
PROCALCITONIN SERPL-MCNC: 0.13 NG/ML — HIGH (ref 0.02–0.1)
PROCALCITONIN SERPL-MCNC: 0.13 NG/ML — HIGH (ref 0.02–0.1)
PROT CSF-MCNC: 42 MG/DL — SIGNIFICANT CHANGE UP (ref 15–45)
PROT SERPL-MCNC: 8.5 G/DL — HIGH (ref 6–8.3)
PROTHROM AB SERPL-ACNC: 11 SEC — SIGNIFICANT CHANGE UP (ref 10–12.9)
RBC # BLD: 3.56 M/UL — LOW (ref 3.8–5.2)
RBC # CSF: 1 /UL — HIGH (ref 0–0)
RBC # FLD: 15.6 % — HIGH (ref 10.3–14.5)
RHEUMATOID FACT SERPL-ACNC: 12 IU/ML — SIGNIFICANT CHANGE UP (ref 0–13)
SODIUM SERPL-SCNC: 137 MMOL/L — SIGNIFICANT CHANGE UP (ref 135–145)
SPECIMEN SOURCE: SIGNIFICANT CHANGE UP
T3FREE SERPL-MCNC: 1.63 PG/ML — LOW (ref 1.8–4.6)
T4 FREE SERPL-MCNC: 0.8 NG/DL — LOW (ref 0.9–1.8)
TUBE TYPE: SIGNIFICANT CHANGE UP
WBC # BLD: 1.58 K/UL — LOW (ref 3.8–10.5)
WBC # FLD AUTO: 1.58 K/UL — LOW (ref 3.8–10.5)

## 2019-10-02 PROCEDURE — 99232 SBSQ HOSP IP/OBS MODERATE 35: CPT | Mod: GC

## 2019-10-02 PROCEDURE — 88108 CYTOPATH CONCENTRATE TECH: CPT | Mod: 26

## 2019-10-02 RX ORDER — LEVOTHYROXINE SODIUM 125 MCG
100 TABLET ORAL
Refills: 0 | Status: DISCONTINUED | OUTPATIENT
Start: 2019-10-03 | End: 2019-10-03

## 2019-10-02 RX ORDER — SODIUM CHLORIDE 9 MG/ML
1000 INJECTION INTRAMUSCULAR; INTRAVENOUS; SUBCUTANEOUS ONCE
Refills: 0 | Status: COMPLETED | OUTPATIENT
Start: 2019-10-02 | End: 2019-10-02

## 2019-10-02 RX ORDER — ZOLPIDEM TARTRATE 10 MG/1
5 TABLET ORAL AT BEDTIME
Refills: 0 | Status: DISCONTINUED | OUTPATIENT
Start: 2019-10-02 | End: 2019-10-02

## 2019-10-02 RX ORDER — SODIUM CHLORIDE 9 MG/ML
1000 INJECTION, SOLUTION INTRAVENOUS
Refills: 0 | Status: DISCONTINUED | OUTPATIENT
Start: 2019-10-02 | End: 2019-10-03

## 2019-10-02 RX ADMIN — SODIUM CHLORIDE 1000 MILLILITER(S): 9 INJECTION INTRAMUSCULAR; INTRAVENOUS; SUBCUTANEOUS at 11:00

## 2019-10-02 RX ADMIN — Medication 60 MILLIGRAM(S): at 18:22

## 2019-10-02 RX ADMIN — ZOLPIDEM TARTRATE 5 MILLIGRAM(S): 10 TABLET ORAL at 22:45

## 2019-10-02 RX ADMIN — Medication 10 MILLIGRAM(S): at 05:36

## 2019-10-02 RX ADMIN — CEFTRIAXONE 100 MILLIGRAM(S): 500 INJECTION, POWDER, FOR SOLUTION INTRAMUSCULAR; INTRAVENOUS at 18:12

## 2019-10-02 RX ADMIN — SODIUM CHLORIDE 100 MILLILITER(S): 9 INJECTION, SOLUTION INTRAVENOUS at 11:01

## 2019-10-02 RX ADMIN — Medication 60 MILLIGRAM(S): at 18:10

## 2019-10-02 NOTE — CONSULT NOTE ADULT - REASON FOR ADMISSION
Fever, headache and facial swelling

## 2019-10-02 NOTE — PROGRESS NOTE ADULT - SUBJECTIVE AND OBJECTIVE BOX
24y Female is under our care for fevers and headaches, history of SLE. Patient continues to feel better and admits that h/as had resolved. Had another fever spike yesterday afternoon of 102.7F. Mri brain was normal. Though no signs of meninigitis, can go for LP today.      REVIEW OF SYSTEMS:  [  ] Not able to illicit  General: no fevers no malaise  Chest: no cough no sob  GI: no nvd  : no urinary sxs   Skin: no rashes  Musculoskeletal: no trauma no LBP  Neuro: no ha's no dizziness     MEDS:  cefTRIAXone   IVPB 1000 milliGRAM(s) IV Intermittent every 24 hours    ALLERGIES: Allergies    No Known Allergies    Intolerances      VITALS:  Vital Signs Last 24 Hrs  T(C): 37.1 (02 Oct 2019 14:13), Max: 37.6 (01 Oct 2019 17:37)  T(F): 98.7 (02 Oct 2019 14:13), Max: 99.6 (01 Oct 2019 17:37)  HR: 105 (02 Oct 2019 14:13) (91 - 107)  BP: 99/58 (02 Oct 2019 14:13) (94/65 - 99/58)  BP(mean): --  RR: 16 (02 Oct 2019 14:13) (16 - 16)  SpO2: 100% (02 Oct 2019 14:13) (100% - 100%)      PHYSICAL EXAM:  HEENT: n/a  Neck: supple no LN's no JVD  Respiratory: lungs clear no rales no rhonchi  Cardiovascular: S1 S2 reg no murmurs  Gastrointestinal: +BS with soft, nondistended abdomen; nontender  Extremities: no edema no cyanosis  Skin: no rashes  Ortho: no jt swelling  Neuro: AAO x 3 , kernig neg and brudzinski's neg      LABS/DIAGNOSTIC TESTS:                        9.7    1.58  )-----------( 115      ( 02 Oct 2019 06:20 )             30.4     WBC Count: 1.58 K/uL (10-02 @ 06:20)  WBC Count: 1.72 K/uL (10-01 @ 08:20)  WBC Count: 1.68 K/uL (09-30 @ 17:17)    10-02    137  |  102  |  10  ----------------------------<  88  3.6   |  26  |  0.76    Ca    9.0      02 Oct 2019 06:20  Phos  4.7     10-02  Mg     2.2     10-02    TPro  8.5<H>  /  Alb  3.2<L>  /  TBili  0.4  /  DBili  x   /  AST  93<H>  /  ALT  59  /  AlkPhos  60  10-02    Rheumatoid Factor Quant, Serum or Plasma: 12 IU/mL (10.02.19 @ 01:10)    DAVID - pending     Quant B - pending     C-Reactive Protein, Serum (10.02.19 @ 01:10)    C-Reactive Protein, Serum: 1.19 mg/dL    Legionella Antigen, Urine: Negative (10.01.19 @ 14:41)        CULTURES:   .Urine  10-01 @ 14:57   <10,000 CFU/mL Normal Urogenital Iris  --  --      .Blood  10-01 @ 01:06   No growth to date.  --  --      .Blood  10-01 @ 01:04   No growth to date.  --  --        RADIOLOGY:    < from: MR Head w/ IV Cont (10.01.19 @ 17:57) >  XAM:  MR BRAIN IC                            PROCEDURE DATE:  10/01/2019          INTERPRETATION:  MRI brain with and without contrast    History seizures, lupus, psychosis    Contrast Gadavist 7 cc    Comparison CT performed the prior day    There is no mass effect, cortical edema or hydrocephalus. Cortical volume   and white matter signal are preserved. There is no evidence of acute   infarct or previous parenchymal hemorrhage. The orbital and sellar   contents and cerebellar tonsils are within normal limits. There is normal   physiologic enhancement.    IMPRESSION:    Normal brain    < end of copied text >

## 2019-10-02 NOTE — PROGRESS NOTE ADULT - SUBJECTIVE AND OBJECTIVE BOX
still has pancytopenia  fever 102 yesterday  no fever today  mild cough  otherwise feels ok  on prednisone 10 mg a day and antibiotics     MEDICATIONS  (STANDING):  cefTRIAXone   IVPB 1000 milliGRAM(s) IV Intermittent every 24 hours  influenza   Vaccine 0.5 milliLiter(s) IntraMuscular once  lactated ringers. 1000 milliLiter(s) (100 mL/Hr) IV Continuous <Continuous>  predniSONE   Tablet 10 milliGRAM(s) Oral two times a day    MEDICATIONS  (PRN):  acetaminophen   Tablet .. 650 milliGRAM(s) Oral every 6 hours PRN Temp greater or equal to 38C (100.4F), Mild Pain (1 - 3)      Allergies    No Known Allergies    Intolerances        Vital Signs Last 24 Hrs  T(C): 36.6 (02 Oct 2019 05:25), Max: 39.3 (01 Oct 2019 13:50)  T(F): 97.8 (02 Oct 2019 05:25), Max: 102.7 (01 Oct 2019 13:50)  HR: 105 (02 Oct 2019 09:34) (91 - 107)  BP: 96/66 (02 Oct 2019 09:34) (90/50 - 97/68)  BP(mean): --  RR: 16 (02 Oct 2019 05:25) (16 - 16)  SpO2: 100% (02 Oct 2019 05:25) (99% - 100%)    PHYSICAL EXAM  General: adult in NAD  HEENT: clear oropharynx, anicteric sclera, pink conjunctiva  Neck: supple  CV: normal S1/S2 with no murmur rubs or gallops  Lungs: positive air movement b/l ant lungs,clear to auscultation, no wheezes, no rales  Abdomen: soft non-tender non-distended, no hepatosplenomegaly  Ext: no clubbing cyanosis or edema  Skin: no rashes and no petechiae  Neuro: alert and oriented X 4, no focal deficits  LABS:                          9.7    1.58  )-----------( 115      ( 02 Oct 2019 06:20 )             30.4         Mean Cell Volume : 85.4 fl  Mean Cell Hemoglobin : 27.2 pg  Mean Cell Hemoglobin Concentration : 31.9 gm/dL  Auto Neutrophil # : 0.73 K/uL  Auto Lymphocyte # : 0.73 K/uL  Auto Monocyte # : 0.13 K/uL  Auto Eosinophil # : 0.00 K/uL  Auto Basophil # : 0.00 K/uL  Auto Neutrophil % : 46.0 %  Auto Lymphocyte % : 46.0 %  Auto Monocyte % : 8.0 %  Auto Eosinophil % : 0.0 %  Auto Basophil % : 0.0 %    Serial CBC  Hematocrit 30.4  Hemoglobin 9.7  Plat 115  RBC 3.56  WBC 1.58  Serial CBC  Hematocrit 28.8  Hemoglobin 9.2  Plat 79  RBC 3.33  WBC 1.72  Serial CBC  Hematocrit 30.1  Hemoglobin 9.8  Plat 85  RBC 3.45  WBC 1.68    10-02    137  |  102  |  10  ----------------------------<  88  3.6   |  26  |  0.76    Ca    9.0      02 Oct 2019 06:20  Phos  4.7     10-02  Mg     2.2     10-02    TPro  8.5<H>  /  Alb  3.2<L>  /  TBili  0.4  /  DBili  x   /  AST  93<H>  /  ALT  59  /  AlkPhos  60  10-02      PT/INR - ( 02 Oct 2019 12:36 )   PT: 11.0 sec;   INR: 0.99 ratio         PTT - ( 02 Oct 2019 12:36 )  PTT:28.2 sec    Vitamin B12, Serum: 419 pg/mL (10-01 @ 14:59)            BLOOD SMEAR INTERPRETATION:       RADIOLOGY & ADDITIONAL STUDIES:

## 2019-10-02 NOTE — PROGRESS NOTE ADULT - ATTENDING COMMENTS
Patient seen/evaluated at bedside 10/2/19. I agree with the resident progress note/outlined plan of care. My independent findings and conclusions are documented.    No recurrence of fevers. Feels better today. Had mild headache after LP. no nausea/vomiting/ abdominal pain, dysuria, diarrhea. She actually denies sick contacts to me. She remains on empiric ceftriaxone    vital reviewed  AOx3 NAD, non toxic appearing  no nuchal rigidity  S1S2 RRR  CTAB/l no accessory muscle use  soft, NT, ND, + BS  no LE edema/cyanosis/clubbing  negative kernig's/brudzinsky's signs    influenza negative  MRI brain: negative    1. fever   2. headache  3. suspected SLE flare status post LP  4. pancytopenia    no findings to suggest focal source of infection. C3 slightly lowered  if preliminary cultures are negative, would increase to treatment dose steroids if fever recurrent  follow up full CSF analysis  rheumatology consult, Dr Melo pending input  pancytopenia likely related to underlying SLE but will f/u with hematology oncology  Met with patient and mother at bedside. MRI brain results provided .

## 2019-10-02 NOTE — PROGRESS NOTE ADULT - ASSESSMENT
1.	Fevers - ?source  2.	Pancytopenia  3.	R/o'ed lupus cerebritis   ·	cont rocephin 1gm IV daily  d2  ·	going for LP today

## 2019-10-02 NOTE — CONSULT NOTE ADULT - SUBJECTIVE AND OBJECTIVE BOX
Patient is a 24y old  Female who presents with a chief complaint of Fever, headache and facial swelling (02 Oct 2019 13:39)      HPI:  Pt is a  24 year old female with medical history significant for Lupus( On prednisone 10 daily), Neuropsychiatric SLE who came in with fever and headache for 10 days. Pt states that she has been having headache for last 2 weeks, generalized, 8/10 now with some blurring in vision recently. Pt was also having fever upto 104 at home and went to pmd and was started on oral antibiotic. Pt states that she was not feeling better and came to ED. Pt was diagnosed with Lupus in  and was initially on Mycophenolate and hydroxychlorquine that was discontinued for unsure reasons. Pt reports that her blood numbers have been running low, and has recently started following Dr Jose Alejandro Taylor. Pt has an episode of Lupus psychosis with seizures earlier in .    Review os systems + headcahe, vision blurring, nausea, fever, facial pain, fatigue and joint pain.  Denies any chest pain, palpitations, sob, urinary symptoms or bowel symptoms. Pt was in [place] 2 weeks ago and states to have a viral like illness (30 Sep 2019 23:27)        PAST MEDICAL & SURGICAL HISTORY:  Lupus  Systemic lupus erythematosus  No significant past surgical history      MEDICATIONS  (STANDING):  cefTRIAXone   IVPB 1000 milliGRAM(s) IV Intermittent every 24 hours  influenza   Vaccine 0.5 milliLiter(s) IntraMuscular once  lactated ringers. 1000 milliLiter(s) (100 mL/Hr) IV Continuous <Continuous>  predniSONE   Tablet 60 milliGRAM(s) Oral daily    MEDICATIONS  (PRN):  acetaminophen   Tablet .. 650 milliGRAM(s) Oral every 6 hours PRN Temp greater or equal to 38C (100.4F), Mild Pain (1 - 3)      Allergies    No Known Allergies    Intolerances                              9.7    1.58  )-----------( 115      ( 02 Oct 2019 06:20 )             30.4       10-02    137  |  102  |  10  ----------------------------<  88  3.6   |  26  |  0.76    Ca    9.0      02 Oct 2019 06:20  Phos  4.7     10-02  Mg     2.2     10-02    TPro  8.5<H>  /  Alb  3.2<L>  /  TBili  0.4  /  DBili  x   /  AST  93<H>  /  ALT  59  /  AlkPhos  60  10-02      Urinalysis Basic - ( 01 Oct 2019 10:30 )    Color: Yellow / Appearance: Clear / S.010 / pH: x  Gluc: x / Ketone: Negative  / Bili: Negative / Urobili: Negative   Blood: x / Protein: Negative / Nitrite: Negative   Leuk Esterase: Negative / RBC: x / WBC x   Sq Epi: x / Non Sq Epi: x / Bacteria: x          Vital Signs Last 24 Hrs  T(C): 37.1 (02 Oct 2019 14:13), Max: 37.5 (01 Oct 2019 20:15)  T(F): 98.7 (02 Oct 2019 14:13), Max: 99.5 (01 Oct 2019 20:15)  HR: 105 (02 Oct 2019 14:13) (91 - 107)  BP: 99/58 (02 Oct 2019 14:13) (94/65 - 99/58)  BP(mean): --  RR: 16 (02 Oct 2019 14:13) (16 - 16)  SpO2: 100% (02 Oct 2019 14:13) (100% - 100%)    Physical Exam  Constitutional:    ENMT:    Respiratory:L-clear    Cardiovascular:[id number]    Gastrointestinal:+BS soft nont    Extremities:-C/C/E    Skin:intact    Musculoskeletal: no synovitis in peripheral joints

## 2019-10-02 NOTE — PROGRESS NOTE ADULT - PROBLEM SELECTOR PLAN 1
Fever for 2 weeks after recent travel from [place], Sick contacts exposure   No meningeal signs and negative cultures  Verbal consent taken for Spinal tap today    Empiric Ceftriaxone

## 2019-10-02 NOTE — PROGRESS NOTE ADULT - ASSESSMENT
Pt is a  24 year old female with medical history significant for pancytopenia, Lupus (On prednisone 20 daily), Neuropsychiatric SLE who came in with fever and headache for 10 days.

## 2019-10-02 NOTE — PROGRESS NOTE ADULT - PROBLEM SELECTOR PLAN 2
History of chronic pancytopenia likely from SLE  ANC of 950  She saw Dr Dailey one week ago outpatient Pt diagnosed with SLE in 2014   Primary Rheumatologist Dr Blount [phone]  Seizure history 2 years ago, and had normal LP done  Low C3 Complement levels  F/u HIV and dsDNA   Normal brain MRI, Plan for LP today  Currently on prednisone 10 mg BID, Quit taking immunosuppressive therapy several months ago  Plan discussed with Dr Melo Pt diagnosed with SLE in 2014 and had seizures, neuropsychic effects   Primary Rheumatologist Dr Blount [phone]  Low C3 Complement levels  F/u dsDNA   S/p LP today  Quit taking immunosuppressive therapy several months ago  Increase steroids from home dose to 60mg  Plan discussed with Dr Melo

## 2019-10-02 NOTE — PROGRESS NOTE ADULT - PROBLEM SELECTOR PLAN 4
TSH of 12.7  Low Free T3, T4 TSH of 12.7  Low Free T3, T4  Started Levothyroxine 100ugm  F/u TSH in 4-6 weeks TSH of 12.7  Low Free T3, T4  Started Levothyroxine 100ugm  F/u Anti Thyroid Ab  Monitor TSH in 4-6 weeks

## 2019-10-02 NOTE — PROGRESS NOTE ADULT - PROBLEM SELECTOR PLAN 3
Pt diagnosed with SLE in 2014   Seizure history 2 years ago, and had normal LP done  Low C3 Complement levels  F/u HIV and dsDNA   Currently on prednisone 10 mg BID  Quit taking immunosuppressive therapy several months ago  Normal MRI scan  Plan discussed with Dr Melo  Primary Rheumatologist Dr Blount [phone] Pt diagnosed with SLE in 2014   Primary Rheumatologist Dr Blount [phone]  Seizure history 2 years ago, and had normal LP done  Low C3 Complement levels  F/u HIV and dsDNA   Normal brain MRI, Plan for LP today  Currently on prednisone 10 mg BID, Quit taking immunosuppressive therapy several months ago  Plan discussed with Dr Melo History of chronic pancytopenia likely from SLE  ANC of 950  She saw Dr Dailey one week ago outpatient

## 2019-10-02 NOTE — PROGRESS NOTE ADULT - SUBJECTIVE AND OBJECTIVE BOX
PGY 3 Note discussed with primary attending    Patient is a 24y old  Female who presents with a chief complaint of Fever, headache and facial swelling (01 Oct 2019 20:58)      INTERVAL HPI/OVERNIGHT EVENTS: offers no new complaints; current symptoms resolving    MEDICATIONS  (STANDING):  cefTRIAXone   IVPB 1000 milliGRAM(s) IV Intermittent every 24 hours  influenza   Vaccine 0.5 milliLiter(s) IntraMuscular once  lactated ringers. 1000 milliLiter(s) (100 mL/Hr) IV Continuous <Continuous>  predniSONE   Tablet 10 milliGRAM(s) Oral two times a day  sodium chloride 0.9% Bolus 1000 milliLiter(s) IV Bolus once    MEDICATIONS  (PRN):  acetaminophen   Tablet .. 650 milliGRAM(s) Oral every 6 hours PRN Temp greater or equal to 38C (100.4F), Mild Pain (1 - 3)      __________________________________________________  Vital Signs Last 24 Hrs  T(C): 36.6 (02 Oct 2019 05:25), Max: 39.3 (01 Oct 2019 13:50)  T(F): 97.8 (02 Oct 2019 05:25), Max: 102.7 (01 Oct 2019 13:50)  HR: 105 (02 Oct 2019 09:34) (91 - 107)  BP: 96/66 (02 Oct 2019 09:34) (90/50 - 97/68)  BP(mean): --  RR: 16 (02 Oct 2019 05:25) (16 - 16)  SpO2: 100% (02 Oct 2019 05:25) (99% - 100%)    ________________________________________________  PHYSICAL EXAM:  GENERAL: NAD Young  female   HEENT: Normocephalic;  conjunctivae and sclerae clear; left molar inflammation    NECK : supple  CHEST/LUNG: Clear to auscultation bilaterally with good air entry   HEART: S1 S2  regular; no murmurs, gallops or rubs  ABDOMEN: Soft, Nontender, Nondistended; Bowel sounds present  EXTREMITIES: no cyanosis; no edema; no calf tenderness, tattoos  SKIN: warm and dry; no rash  NERVOUS SYSTEM:  Awake and alert x 3    _________________________________________________  LABS:                        9.7    1.58  )-----------( 115      ( 02 Oct 2019 06:20 )             30.4     10    137  |  102  |  10  ----------------------------<  88  3.6   |  26  |  0.76    Ca    9.0      02 Oct 2019 06:20  Phos  4.7     10-02  Mg     2.2     10-02    TPro  8.5<H>  /  Alb  3.2<L>  /  TBili  0.4  /  DBili  x   /  AST  93<H>  /  ALT  59  /  AlkPhos  60  10-02      Urinalysis Basic - ( 01 Oct 2019 10:30 )    Color: Yellow / Appearance: Clear / S.010 / pH: x  Gluc: x / Ketone: Negative  / Bili: Negative / Urobili: Negative   Blood: x / Protein: Negative / Nitrite: Negative   Leuk Esterase: Negative / RBC: x / WBC x   Sq Epi: x / Non Sq Epi: x / Bacteria: x        Plan of care was discussed with patient and /or primary care giver; all questions and concerns were addressed and care was aligned with patient's wishes.

## 2019-10-03 ENCOUNTER — TRANSCRIPTION ENCOUNTER (OUTPATIENT)
Age: 24
End: 2019-10-03

## 2019-10-03 DIAGNOSIS — M32.19 OTHER ORGAN OR SYSTEM INVOLVEMENT IN SYSTEMIC LUPUS ERYTHEMATOSUS: ICD-10-CM

## 2019-10-03 LAB
ALBUMIN SERPL ELPH-MCNC: 3 G/DL — LOW (ref 3.5–5)
ALP SERPL-CCNC: 56 U/L — SIGNIFICANT CHANGE UP (ref 40–120)
ALT FLD-CCNC: 54 U/L DA — SIGNIFICANT CHANGE UP (ref 10–60)
ANION GAP SERPL CALC-SCNC: 8 MMOL/L — SIGNIFICANT CHANGE UP (ref 5–17)
AST SERPL-CCNC: 69 U/L — HIGH (ref 10–40)
BASOPHILS # BLD AUTO: 0 K/UL — SIGNIFICANT CHANGE UP (ref 0–0.2)
BASOPHILS NFR BLD AUTO: 0 % — SIGNIFICANT CHANGE UP (ref 0–2)
BILIRUB SERPL-MCNC: 0.3 MG/DL — SIGNIFICANT CHANGE UP (ref 0.2–1.2)
BUN SERPL-MCNC: 9 MG/DL — SIGNIFICANT CHANGE UP (ref 7–18)
CALCIUM SERPL-MCNC: 8.8 MG/DL — SIGNIFICANT CHANGE UP (ref 8.4–10.5)
CHLORIDE SERPL-SCNC: 103 MMOL/L — SIGNIFICANT CHANGE UP (ref 96–108)
CO2 SERPL-SCNC: 27 MMOL/L — SIGNIFICANT CHANGE UP (ref 22–31)
CREAT SERPL-MCNC: 0.66 MG/DL — SIGNIFICANT CHANGE UP (ref 0.5–1.3)
CRYPTOC AG CSF-ACNC: NEGATIVE — SIGNIFICANT CHANGE UP
CSF PCR RESULT: SIGNIFICANT CHANGE UP
EOSINOPHIL # BLD AUTO: 0 K/UL — SIGNIFICANT CHANGE UP (ref 0–0.5)
EOSINOPHIL NFR BLD AUTO: 0 % — SIGNIFICANT CHANGE UP (ref 0–6)
GAMMA INTERFERON BACKGROUND BLD IA-ACNC: 0.38 IU/ML — SIGNIFICANT CHANGE UP
GLUCOSE SERPL-MCNC: 108 MG/DL — HIGH (ref 70–99)
GRAM STN FLD: SIGNIFICANT CHANGE UP
HCT VFR BLD CALC: 29.7 % — LOW (ref 34.5–45)
HGB BLD-MCNC: 9.5 G/DL — LOW (ref 11.5–15.5)
IMM GRANULOCYTES NFR BLD AUTO: 0.8 % — SIGNIFICANT CHANGE UP (ref 0–1.5)
LDH CSF L TO P-CCNC: 17 U/L — SIGNIFICANT CHANGE UP
LDH FLD-CCNC: 17 U/L — SIGNIFICANT CHANGE UP
LYMPHOCYTES # BLD AUTO: 0.6 K/UL — LOW (ref 1–3.3)
LYMPHOCYTES # BLD AUTO: 48 % — HIGH (ref 13–44)
M TB IFN-G BLD-IMP: ABNORMAL
M TB IFN-G CD4+ BCKGRND COR BLD-ACNC: 0.03 IU/ML — SIGNIFICANT CHANGE UP
M TB IFN-G CD4+CD8+ BCKGRND COR BLD-ACNC: 0.05 IU/ML — SIGNIFICANT CHANGE UP
MAGNESIUM SERPL-MCNC: 2.2 MG/DL — SIGNIFICANT CHANGE UP (ref 1.6–2.6)
MCHC RBC-ENTMCNC: 27.5 PG — SIGNIFICANT CHANGE UP (ref 27–34)
MCHC RBC-ENTMCNC: 32 GM/DL — SIGNIFICANT CHANGE UP (ref 32–36)
MCV RBC AUTO: 85.8 FL — SIGNIFICANT CHANGE UP (ref 80–100)
MONOCYTES # BLD AUTO: 0.08 K/UL — SIGNIFICANT CHANGE UP (ref 0–0.9)
MONOCYTES NFR BLD AUTO: 6.4 % — SIGNIFICANT CHANGE UP (ref 2–14)
NEUTROPHILS # BLD AUTO: 0.56 K/UL — LOW (ref 1.8–7.4)
NEUTROPHILS NFR BLD AUTO: 44.8 % — SIGNIFICANT CHANGE UP (ref 43–77)
NRBC # BLD: 0 /100 WBCS — SIGNIFICANT CHANGE UP (ref 0–0)
PHOSPHATE SERPL-MCNC: 4.9 MG/DL — HIGH (ref 2.5–4.5)
PLATELET # BLD AUTO: 108 K/UL — LOW (ref 150–400)
POTASSIUM SERPL-MCNC: 3.7 MMOL/L — SIGNIFICANT CHANGE UP (ref 3.5–5.3)
POTASSIUM SERPL-SCNC: 3.7 MMOL/L — SIGNIFICANT CHANGE UP (ref 3.5–5.3)
PROT SERPL-MCNC: 7.9 G/DL — SIGNIFICANT CHANGE UP (ref 6–8.3)
QUANT TB PLUS MITOGEN MINUS NIL: 0.13 IU/ML — SIGNIFICANT CHANGE UP
RBC # BLD: 3.46 M/UL — LOW (ref 3.8–5.2)
RBC # FLD: 15.6 % — HIGH (ref 10.3–14.5)
SODIUM SERPL-SCNC: 138 MMOL/L — SIGNIFICANT CHANGE UP (ref 135–145)
SPECIMEN SOURCE: SIGNIFICANT CHANGE UP
THYROGLOB AB FLD IA-ACNC: 204 IU/ML — HIGH (ref 0–40)
THYROGLOB AB SERPL-ACNC: 204 IU/ML — HIGH (ref 0–40)
THYROPEROXIDASE AB SERPL-ACNC: 10 IU/ML — SIGNIFICANT CHANGE UP (ref 0–34.9)
TM INTERPRETATION: SIGNIFICANT CHANGE UP
WBC # BLD: 1.25 K/UL — LOW (ref 3.8–10.5)
WBC # FLD AUTO: 1.25 K/UL — LOW (ref 3.8–10.5)

## 2019-10-03 PROCEDURE — 88188 FLOWCYTOMETRY/READ 9-15: CPT

## 2019-10-03 PROCEDURE — 99232 SBSQ HOSP IP/OBS MODERATE 35: CPT | Mod: GC

## 2019-10-03 RX ORDER — LANOLIN ALCOHOL/MO/W.PET/CERES
5 CREAM (GRAM) TOPICAL AT BEDTIME
Refills: 0 | Status: COMPLETED | OUTPATIENT
Start: 2019-10-03 | End: 2019-10-03

## 2019-10-03 RX ORDER — SODIUM CHLORIDE 9 MG/ML
1000 INJECTION, SOLUTION INTRAVENOUS
Refills: 0 | Status: DISCONTINUED | OUTPATIENT
Start: 2019-10-03 | End: 2019-10-05

## 2019-10-03 RX ORDER — LEVOTHYROXINE SODIUM 125 MCG
100 TABLET ORAL
Refills: 0 | Status: DISCONTINUED | OUTPATIENT
Start: 2019-10-03 | End: 2019-10-05

## 2019-10-03 RX ORDER — ZOLPIDEM TARTRATE 10 MG/1
5 TABLET ORAL AT BEDTIME
Refills: 0 | Status: DISCONTINUED | OUTPATIENT
Start: 2019-10-03 | End: 2019-10-03

## 2019-10-03 RX ADMIN — ZOLPIDEM TARTRATE 5 MILLIGRAM(S): 10 TABLET ORAL at 22:17

## 2019-10-03 RX ADMIN — SODIUM CHLORIDE 100 MILLILITER(S): 9 INJECTION, SOLUTION INTRAVENOUS at 11:00

## 2019-10-03 RX ADMIN — CEFTRIAXONE 100 MILLIGRAM(S): 500 INJECTION, POWDER, FOR SOLUTION INTRAMUSCULAR; INTRAVENOUS at 18:20

## 2019-10-03 RX ADMIN — Medication 100 MICROGRAM(S): at 06:14

## 2019-10-03 RX ADMIN — Medication 650 MILLIGRAM(S): at 22:18

## 2019-10-03 RX ADMIN — Medication 650 MILLIGRAM(S): at 23:53

## 2019-10-03 NOTE — PROGRESS NOTE ADULT - SUBJECTIVE AND OBJECTIVE BOX
mild headache probably from LP  no fever  a little cough  no N/V/D  no pain    MEDICATIONS  (STANDING):  cefTRIAXone   IVPB 1000 milliGRAM(s) IV Intermittent every 24 hours  influenza   Vaccine 0.5 milliLiter(s) IntraMuscular once  lactated ringers. 1000 milliLiter(s) (100 mL/Hr) IV Continuous <Continuous>  levothyroxine 100 MICROGram(s) Oral <User Schedule>  melatonin 5 milliGRAM(s) Oral at bedtime  predniSONE   Tablet 60 milliGRAM(s) Oral daily    MEDICATIONS  (PRN):  acetaminophen   Tablet .. 650 milliGRAM(s) Oral every 6 hours PRN Temp greater or equal to 38C (100.4F), Mild Pain (1 - 3)  zolpidem 5 milliGRAM(s) Oral at bedtime PRN Insomnia      Allergies    No Known Allergies    Intolerances        Vital Signs Last 24 Hrs  T(C): 36.8 (03 Oct 2019 14:09), Max: 37.2 (02 Oct 2019 21:49)  T(F): 98.3 (03 Oct 2019 14:09), Max: 98.9 (02 Oct 2019 21:49)  HR: 101 (03 Oct 2019 14:09) (56 - 101)  BP: 108/70 (03 Oct 2019 14:09) (105/71 - 108/70)  BP(mean): --  RR: 20 (03 Oct 2019 14:09) (17 - 20)  SpO2: 100% (03 Oct 2019 14:09) (99% - 100%)    PHYSICAL EXAM  General: adult in NAD  HEENT: clear oropharynx, anicteric sclera, pink conjunctiva  Neck: supple  CV: normal S1/S2 with no murmur rubs or gallops  Lungs: positive air movement b/l ant lungs,clear to auscultation, no wheezes, no rales  Abdomen: soft non-tender non-distended, no hepatosplenomegaly  Ext: no clubbing cyanosis or edema  Skin: no rashes and no petechiae  Neuro: alert and oriented X 4, no focal deficits  LABS:                          9.5    1.25  )-----------( 108      ( 03 Oct 2019 08:23 )             29.7         Mean Cell Volume : 85.8 fl  Mean Cell Hemoglobin : 27.5 pg  Mean Cell Hemoglobin Concentration : 32.0 gm/dL  Auto Neutrophil # : 0.56 K/uL  Auto Lymphocyte # : 0.60 K/uL  Auto Monocyte # : 0.08 K/uL  Auto Eosinophil # : 0.00 K/uL  Auto Basophil # : 0.00 K/uL  Auto Neutrophil % : 44.8 %  Auto Lymphocyte % : 48.0 %  Auto Monocyte % : 6.4 %  Auto Eosinophil % : 0.0 %  Auto Basophil % : 0.0 %    Serial CBC  Hematocrit 29.7  Hemoglobin 9.5  Plat 108  RBC 3.46  WBC 1.25  Serial CBC  Hematocrit 30.4  Hemoglobin 9.7  Plat 115  RBC 3.56  WBC 1.58  Serial CBC  Hematocrit 28.8  Hemoglobin 9.2  Plat 79  RBC 3.33  WBC 1.72  Serial CBC  Hematocrit 30.1  Hemoglobin 9.8  Plat 85  RBC 3.45  WBC 1.68    10-03    138  |  103  |  9   ----------------------------<  108<H>  3.7   |  27  |  0.66    Ca    8.8      03 Oct 2019 08:23  Phos  4.9     10-03  Mg     2.2     10-03    TPro  7.9  /  Alb  3.0<L>  /  TBili  0.3  /  DBili  x   /  AST  69<H>  /  ALT  54  /  AlkPhos  56  10-03      PT/INR - ( 02 Oct 2019 12:36 )   PT: 11.0 sec;   INR: 0.99 ratio         PTT - ( 02 Oct 2019 12:36 )  PTT:28.2 sec    Vitamin B12, Serum: 419 pg/mL (10-01 @ 14:59)            BLOOD SMEAR INTERPRETATION:       RADIOLOGY & ADDITIONAL STUDIES:

## 2019-10-03 NOTE — DISCHARGE NOTE PROVIDER - CARE PROVIDER_API CALL
Claudia Blount  [address] [place] [address]  Phone: [phone]  Fax: (   )    -  Follow Up Time:     Kelly Tovar (MD)  Internal Medicine  [address] [facility], [address] [place] [address]  Phone: [phone]  Follow Up Time:

## 2019-10-03 NOTE — DISCHARGE NOTE PROVIDER - NSDCCPCAREPLAN_GEN_ALL_CORE_FT
PRINCIPAL DISCHARGE DIAGNOSIS  Diagnosis: Pancytopenia  Assessment and Plan of Treatment: You were found to have low cell count from uncontrolled SLE. You were given high dose oral steroids for Lupus flare. you are advised to get your blood levels checked agin after discharge and see your Rheumatologist Dr alvarenga for immunosupressive therapy. You need all these medications. to prevent further complications of Lupus eg Renal, cardiac and brain.      SECONDARY DISCHARGE DIAGNOSES  Diagnosis: SLE exacerbation  Assessment and Plan of Treatment: You were admitted for fever and headache. MRi brain showed no acute pathology. Spinal tap was also performed which ruled out infection and showed Lupus flare. Her steroids home dose was increased.    Diagnosis: Hypothyroid  Assessment and Plan of Treatment: You were found to have elevated TSH and low T3 adn t4. Repeat thyroid tests in 6 weeks and keep taking your thyroid medications.    Diagnosis: Lupus cerebritis  Assessment and Plan of Treatment: You were admitted for fever and headache. MRi brain showed no acute pathology. Spinal tap was also performed which ruled out infection and showed Lupus flare. Her steroids home dose was increased.    Diagnosis: Facial swelling  Assessment and Plan of Treatment: PRINCIPAL DISCHARGE DIAGNOSIS  Diagnosis: Pancytopenia  Assessment and Plan of Treatment: You were found to have low cell count from uncontrolled SLE. You were given high dose oral steroids for Lupus flare. you are advised to get your blood levels checked agin after discharge and see your Rheumatologist Dr alvarenga for immunosupressive therapy. You need all these medications. to prevent further complications of Lupus eg Renal, cardiac and brain.      SECONDARY DISCHARGE DIAGNOSES  Diagnosis: SLE exacerbation  Assessment and Plan of Treatment: You were admitted for fever and headache. MRi brain showed no acute pathology. Spinal tap was also performed which ruled out infection and showed Lupus flare. Her steroids home dose was increased.    Diagnosis: Hypothyroid  Assessment and Plan of Treatment: You were found to have elevated TSH and low T3 adn t4. Repeat thyroid tests in 6 weeks and keep taking your thyroid medications.    Diagnosis: Lupus cerebritis  Assessment and Plan of Treatment: You were admitted for fever and headache. MRi brain showed no acute pathology. Spinal tap was also performed which ruled out infection and showed Lupus flare. Her steroids home dose was increased. PRINCIPAL DISCHARGE DIAGNOSIS  Diagnosis: SLE exacerbation  Assessment and Plan of Treatment: You were admitted for fever and headache. MRi brain showed no acute pathology. Spinal tap was also performed which ruled out infection and showed Lupus flare. Her steroids home dose was increased.      SECONDARY DISCHARGE DIAGNOSES  Diagnosis: Pancytopenia  Assessment and Plan of Treatment: You were found to have low cell count from uncontrolled SLE. You were given high dose oral steroids for Lupus flare. you are advised to get your blood levels checked agin after discharge and see your Rheumatologist Dr alvarenga for immunosupressive therapy. You need all these medications. to prevent further complications of Lupus eg Renal, cardiac and brain.    Diagnosis: Hypothyroid  Assessment and Plan of Treatment: You were found to have elevated TSH and low T3 adn t4. Repeat thyroid tests in 6 weeks and keep taking your thyroid medications.    Diagnosis: Lupus cerebritis  Assessment and Plan of Treatment: You were admitted for fever and headache. MRi brain showed no acute pathology. Spinal tap was also performed which ruled out infection and showed Lupus flare. Her steroids home dose was increased to prednisone 60mg with recommendation for tapering to be done by her primary rheumatologist.    Diagnosis: Diarrheal stools  Assessment and Plan of Treatment: During hospitalization you had diarrheal bowel movements. You were noted to have low potassium as a result of the diarrhea and were give oral supplementation. Your c difficile stool test returned as negative result on 10/5/19    Diagnosis: SLE exacerbation  Assessment and Plan of Treatment: You were admitted for fever and headache. MRi brain showed no acute pathology. Spinal tap was also performed which ruled out infection and showed Lupus flare. Her steroids home dose was increased. PRINCIPAL DISCHARGE DIAGNOSIS  Diagnosis: SLE exacerbation  Assessment and Plan of Treatment: You were admitted for fever and headache. MRi brain showed no acute pathology. Spinal tap was also performed which ruled out infection and showed Lupus flare. Her steroids home dose was increased to prednisone 60mg with recommendation for tapering to be done by her primary rheumatologist.      SECONDARY DISCHARGE DIAGNOSES  Diagnosis: Pancytopenia  Assessment and Plan of Treatment: You were found to have low cell count from uncontrolled SLE. You were given high dose oral steroids for Lupus flare. you are advised to get your blood levels checked agin after discharge and see your Rheumatologist Dr alvarenga for immunosupressive therapy. You need all these medications. to prevent further complications of Lupus eg Renal, cardiac and brain.    Diagnosis: Hypothyroid  Assessment and Plan of Treatment: You were found to have elevated TSH and low T3 adn t4. Repeat thyroid tests in 6 weeks and keep taking your thyroid medications.    Diagnosis: Lupus cerebritis  Assessment and Plan of Treatment: You were admitted for fever and headache. MRi brain showed no acute pathology. Spinal tap was also performed which ruled out infection and showed Lupus flare. Her steroids home dose was increased to prednisone 60mg with recommendation for tapering to be done by her primary rheumatologist.    Diagnosis: Diarrheal stools  Assessment and Plan of Treatment: During hospitalization you had diarrheal bowel movements. You were noted to have low potassium as a result of the diarrhea and were give oral supplementation. Your c difficile stool test returned as negative result on 10/5/19    Diagnosis: SLE exacerbation  Assessment and Plan of Treatment: You were admitted for fever and headache. MRi brain showed no acute pathology. Spinal tap was also performed which ruled out infection and showed Lupus flare. Her steroids home dose was increased.

## 2019-10-03 NOTE — PROGRESS NOTE ADULT - ASSESSMENT
1.	Fevers - none today so far ?source  2.	Pancytopenia  ·	cont rocephin 1gm IV daily  D3  ·	awaiting DAVID results 1.	Fevers - none today so far ?source, possibly secondary to lupus  2.	Pancytopenia  ·	cont rocephin 1gm IV daily  D3  ·	awaiting DAVID results

## 2019-10-03 NOTE — PROGRESS NOTE ADULT - PROBLEM SELECTOR PLAN 4
TSH of 12.7  Low Free T3, T4  Started Levothyroxine 100ugm  F/u Anti Thyroid Ab  Monitor TSH in 4-6 weeks

## 2019-10-03 NOTE — PROGRESS NOTE ADULT - PROBLEM SELECTOR PLAN 1
Worsening pancytopenia from SLE flare  ANC of 560  Increased steroids from 20mg to 60mg yesterday  Will monitor response  If does not improve will start Neupogen and/or High dose IV steroids 60q6  Plan discussed with Dr Melo to start immunosuppressives after discharge

## 2019-10-03 NOTE — DISCHARGE NOTE PROVIDER - HOSPITAL COURSE
HPI: Pt is a  24 year old female with medical history significant for Lupus( On prednisone 10 daily), Neuropsychiatric SLE who came in with fever and headache for 10 days. Pt states that she has been having headache for last 2 weeks, generalized, 8/10 now with some blurring in vision recently. Pt was also having fever upto 104 at home and went to pmd and was started on oral antibiotic. Pt states that she was not feeling better and came to ED. Pt was diagnosed with Lupus in 2014 and was initially on Mycophenolate and hydroxychlorquine that was discontinued for unsure reasons. Pt reports that her blood numbers have been running low, and has recently started following Dr Jose Alejandro Taylor. 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        24 female with history of Lupus was admitted for Lupus flare with headache, fever and pancytopenia. Infectious causes were ruled out with normal blood cultures, urine and chest studies. Spinal tap was performed which was indicative of Lupus cerebritis. MRI brain showed no acute pathology. Her home dose steroids were increased. Rheumatologist and Hematologist was also involved in her care. Pt was advised to see her Rheumatologist after discharge for immunosuppressive therapy and gradual tapering of her steroids. HPI: Pt is a  24 year old female with medical history significant for Lupus( On prednisone 10 daily), Neuropsychiatric SLE who came in with fever and headache for 10 days. Pt states that she has been having headache for last 2 weeks, generalized, 8/10 now with some blurring in vision recently. Pt was also having fever upto 104 at home and went to pmd and was started on oral antibiotic. Pt states that she was not feeling better and came to ED. Pt was diagnosed with Lupus in 2014 and was initially on Mycophenolate and hydroxychlorquine that was discontinued for unsure reasons. Pt reports that her blood numbers have been running low, and has recently started following Dr Jose Alejandro Taylor. 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        24 female with history of Lupus was admitted for Lupus flare with headache, fever and pancytopenia. She was assessed by infectious disease service (Dr. Lujan) with recommendation for empiric antibiotic management until infectious process was ruled out. Infectious causes were ruled out with normal blood cultures, urine and chest studies. CSF analysis was performed which was indicative of Lupus cerebritis. MRI brain showed no acute pathology. Her home dose steroids were increased. Rheumatologist and Hematologist was also involved in her care. Pt was advised to reRheumatologist after discharge for immunosuppressive therapy and gradual tapering of her steroids. Hospital course was complicated by the development of diarrhea. C diff testing was negative. Electrolytes were replaced. Patient desired to leave before resolution of her diarrhea and stabilization of electrolytes with AMA discharge done.

## 2019-10-03 NOTE — DISCHARGE NOTE PROVIDER - PROVIDER TOKENS
FREE:[LAST:[Jose Alejandro],FIRST:[Claudia],PHONE:[phone]],FAX:[phone]   [phone]    -],ADDRESS:[[address] [place] [address]]],PROVIDER:[TOKEN:[[id number]]]

## 2019-10-03 NOTE — PROGRESS NOTE ADULT - PROBLEM SELECTOR PLAN 2
Low grade fevers likely from SLE flare    Empiric Ceftriaxone  Normal LP studies and cultures Low grade fevers likely from SLE flare    Empiric Ceftriaxone until DAVID results   Normal LP studies and cultures

## 2019-10-03 NOTE — PROGRESS NOTE ADULT - SUBJECTIVE AND OBJECTIVE BOX
24y Female is under our care for fevers and headaches, history of SLE. Patient is doing well, but admits to some mild h/a's this am which could be from LP that was done yesterday. CSF culture is still testing. Only had a low grade temp of 100.3 last night.  Awaiting DAVID results.    REVIEW OF SYSTEMS:  [  ] Not able to illicit  General: no fevers no malaise  Chest: no cough no sob  GI: no nvd  : no urinary sxs   Skin: no rashes  Musculoskeletal: no trauma no LBP  Neuro: +ha's in am no dizziness     MEDS:  cefTRIAXone   IVPB 1000 milliGRAM(s) IV Intermittent every 24 hours    ALLERGIES: Allergies    No Known Allergies    Intolerances      VITALS:  Vital Signs Last 24 Hrs  T(C): 36.8 (03 Oct 2019 14:09), Max: 37.9 (02 Oct 2019 20:44)  T(F): 98.3 (03 Oct 2019 14:09), Max: 100.3 (02 Oct 2019 20:44)  HR: 101 (03 Oct 2019 14:09) (56 - 101)  BP: 108/70 (03 Oct 2019 14:09) (98/67 - 108/70)  BP(mean): --  RR: 20 (03 Oct 2019 14:09) (17 - 20)  SpO2: 100% (03 Oct 2019 14:09) (97% - 100%)      PHYSICAL EXAM:  HEENT: n/a  Neck: supple no LN's  Respiratory: lungs clear no rales  Cardiovascular: S1 S2 reg no murmurs  Gastrointestinal: +BS with soft, nondistended abdomen; nontender  Extremities: no edema   Skin: no rashes  Ortho: no jt swelling  Neuro: AAO x 3       LABS/DIAGNOSTIC TESTS:                         9.5    1.25  )-----------( 108      ( 03 Oct 2019 08:23 )             29.7   WBC Count: 1.25 K/uL (10-03 @ 08:23)  WBC Count: 1.58 K/uL (10-02 @ 06:20)  WBC Count: 1.72 K/uL (10-01 @ 08:20)  WBC Count: 1.68 K/uL (09-30 @ 17:17)    10-03    138  |  103  |  9   ----------------------------<  108<H>  3.7   |  27  |  0.66    Ca    8.8      03 Oct 2019 08:23  Phos  4.9     10-03  Mg     2.2     10-03    TPro  7.9  /  Alb  3.0<L>  /  TBili  0.3  /  DBili  x   /  AST  69<H>  /  ALT  54  /  AlkPhos  56  10-03     DAVID - pending       CULTURES:   .Urine  10-01 @ 14:57   <10,000 CFU/mL Normal Urogenital Iris  --  --      .Blood  10-01 @ 01:06   No growth to date.  --  --      .Blood  10-01 @ 01:04   No growth to date.  --  --        RADIOLOGY:   No new studies

## 2019-10-03 NOTE — PROGRESS NOTE ADULT - SUBJECTIVE AND OBJECTIVE BOX
PGY 3 Note discussed with primary attending    Patient is a 24y old  Female who presents with a chief complaint of Fever, headache and facial swelling (02 Oct 2019 20:03)      INTERVAL HPI/OVERNIGHT EVENTS: offers no new complaints; current symptoms resolving    MEDICATIONS  (STANDING):  cefTRIAXone   IVPB 1000 milliGRAM(s) IV Intermittent every 24 hours  influenza   Vaccine 0.5 milliLiter(s) IntraMuscular once  lactated ringers. 1000 milliLiter(s) (100 mL/Hr) IV Continuous <Continuous>  levothyroxine 100 MICROGram(s) Oral <User Schedule>  predniSONE   Tablet 60 milliGRAM(s) Oral daily    MEDICATIONS  (PRN):  acetaminophen   Tablet .. 650 milliGRAM(s) Oral every 6 hours PRN Temp greater or equal to 38C (100.4F), Mild Pain (1 - 3)  zolpidem 5 milliGRAM(s) Oral at bedtime PRN Insomnia      __________________________________________________  Vital Signs Last 24 Hrs  T(C): 36.8 (03 Oct 2019 05:05), Max: 37.9 (02 Oct 2019 20:44)  T(F): 98.2 (03 Oct 2019 05:05), Max: 100.3 (02 Oct 2019 20:44)  HR: 56 (03 Oct 2019 05:05) (56 - 105)  BP: 105/71 (03 Oct 2019 05:05) (96/66 - 105/71)  BP(mean): --  RR: 17 (03 Oct 2019 05:05) (16 - 17)  SpO2: 99% (03 Oct 2019 05:05) (97% - 100%)    ________________________________________________  PHYSICAL EXAM:  GENERAL: NAD Young  female   HEENT: Normocephalic;  conjunctivae and sclerae clear; left molar inflammation    NECK : supple  CHEST/LUNG: Clear to auscultation bilaterally with good air entry   HEART: S1 S2  regular; no murmurs, gallops or rubs  ABDOMEN: Soft, Nontender, Nondistended; Bowel sounds present  EXTREMITIES: no cyanosis; no edema; no calf tenderness, tattoos  SKIN: warm and dry; no rash  NERVOUS SYSTEM:  Awake and alert x 3    _________________________________________________  LABS:                        9.5    1.25  )-----------( 108      ( 03 Oct 2019 08:23 )             29.7     10    138  |  103  |  9   ----------------------------<  108<H>  3.7   |  27  |  0.66    Ca    8.8      03 Oct 2019 08:23  Phos  4.9     10-03  Mg     2.2     10-03    TPro  7.9  /  Alb  3.0<L>  /  TBili  0.3  /  DBili  x   /  AST  69<H>  /  ALT  54  /  AlkPhos  56  10    PT/INR - ( 02 Oct 2019 12:36 )   PT: 11.0 sec;   INR: 0.99 ratio         PTT - ( 02 Oct 2019 12:36 )  PTT:28.2 sec  Urinalysis Basic - ( 01 Oct 2019 10:30 )    Color: Yellow / Appearance: Clear / S.010 / pH: x  Gluc: x / Ketone: Negative  / Bili: Negative / Urobili: Negative   Blood: x / Protein: Negative / Nitrite: Negative   Leuk Esterase: Negative / RBC: x / WBC x   Sq Epi: x / Non Sq Epi: x / Bacteria: x        Plan of care was discussed with patient and /or primary care giver; all questions and concerns were addressed and care was aligned with patient's wishes.

## 2019-10-03 NOTE — PROGRESS NOTE ADULT - ATTENDING COMMENTS
Patient seen/evaluated at bedside 10/3/19. I agree with the resident progress note/outlined plan of care. My independent findings and conclusions are documented.    Reports mild HA. No nausea/vomiting, visual changes.   Tmax 100.3 at 10/2/19 7:44 pm    vital reviewed  AOx3 NAD, non toxic appearing  no nuchal rigidity  S1S2 RRR  CTAB/l no accessory muscle use  soft, NT, ND, + BS  no LE edema/cyanosis/clubbing  negative kernig's/brudzinsky's signs    influenza negative  MRI brain: negative    1. fever   2. headache  3. suspected SLE flare status post LP  4. pancytopenia  5. likely autoimmune hypothyroidism    no findings to suggest focal source of infection. C3 slightly lowered  placed on empiric higher dose steroids at 60mg po daily  follow up full CSF analysis, no evidence of acute infectious process. SLE CSF analysis pending  f/u DAVID  rheumatology consult, Dr Melo input noted, resumption of biologic immunosuppressant agents planned as outpt  pancytopenia likely related to underlying SLE but will f/u with hematology oncology  f/u TPO/thyroglobulin results. Would benefit from low dose synthroid based on Free t3/Free T4  Met with patient and mother at bedside. MRI brain results provided . Patient seen/evaluated at bedside 10/3/19. I agree with the resident progress note/outlined plan of care. My independent findings and conclusions are documented.    Reports mild HA. No nausea/vomiting, visual changes.   Tmax 100.3 at 10/2/19 7:44 pm    vital reviewed  AOx3 NAD, non toxic appearing  no nuchal rigidity  S1S2 RRR  CTAB/l no accessory muscle use  soft, NT, ND, + BS  no LE edema/cyanosis/clubbing  negative kernig's/brudzinsky's signs    influenza negative  MRI brain: negative    1. fever   2. headache  3. suspected SLE flare status post LP  4. pancytopenia  5. likely autoimmune hypothyroidism    no findings to suggest focal source of infection. C3 slightly lowered  placed on empiric higher dose steroids at 60mg po daily  follow up full CSF analysis, no evidence of acute infectious process. SLE CSF analysis pending  f/u DAVID  rheumatology consult, Dr Melo input noted, resumption of biologic immunosuppressant agents planned as outpt  pancytopenia likely related to underlying SLE but will f/u with hematology oncology  f/u TPO/thyroglobulin results. c/w home regimen of synthroid, will confirm that she is actually taking this dose  Met with patient and mother at bedside. MRI brain results provided .

## 2019-10-03 NOTE — PROGRESS NOTE ADULT - PROBLEM SELECTOR PLAN 3
History of SLE  Low C3 Complement levels  Normal Brain MRI  Spinal tap significant for Lymphocytosis  Pending IgG  Resolved headache  Primary Rheumatologist Dr Blount [phone]

## 2019-10-04 LAB
ALBUMIN CSF-MCNC: 19.8 MG/DL — SIGNIFICANT CHANGE UP (ref 14–25)
ALBUMIN SERPL ELPH-MCNC: 2.7 G/DL — LOW (ref 3.5–5)
ALBUMIN SERPL ELPH-MCNC: 3093 MG/DL — LOW (ref 3500–5200)
ALP SERPL-CCNC: 53 U/L — SIGNIFICANT CHANGE UP (ref 40–120)
ALT FLD-CCNC: 47 U/L DA — SIGNIFICANT CHANGE UP (ref 10–60)
ANA PAT FLD IF-IMP: ABNORMAL
ANA PAT FLD IF-IMP: ABNORMAL
ANA TITR SER: ABNORMAL
ANA TITR SER: ABNORMAL
ANION GAP SERPL CALC-SCNC: 6 MMOL/L — SIGNIFICANT CHANGE UP (ref 5–17)
AST SERPL-CCNC: 47 U/L — HIGH (ref 10–40)
BASOPHILS # BLD AUTO: 0.01 K/UL — SIGNIFICANT CHANGE UP (ref 0–0.2)
BASOPHILS NFR BLD AUTO: 0.4 % — SIGNIFICANT CHANGE UP (ref 0–2)
BILIRUB SERPL-MCNC: 0.3 MG/DL — SIGNIFICANT CHANGE UP (ref 0.2–1.2)
BUN SERPL-MCNC: 8 MG/DL — SIGNIFICANT CHANGE UP (ref 7–18)
CALCIUM SERPL-MCNC: 8.5 MG/DL — SIGNIFICANT CHANGE UP (ref 8.4–10.5)
CHLORIDE SERPL-SCNC: 107 MMOL/L — SIGNIFICANT CHANGE UP (ref 96–108)
CO2 SERPL-SCNC: 27 MMOL/L — SIGNIFICANT CHANGE UP (ref 22–31)
CREAT SERPL-MCNC: 0.62 MG/DL — SIGNIFICANT CHANGE UP (ref 0.5–1.3)
EOSINOPHIL # BLD AUTO: 0 K/UL — SIGNIFICANT CHANGE UP (ref 0–0.5)
EOSINOPHIL NFR BLD AUTO: 0 % — SIGNIFICANT CHANGE UP (ref 0–6)
GLUCOSE SERPL-MCNC: 88 MG/DL — SIGNIFICANT CHANGE UP (ref 70–99)
HCT VFR BLD CALC: 28.2 % — LOW (ref 34.5–45)
HGB BLD-MCNC: 8.9 G/DL — LOW (ref 11.5–15.5)
IGG CSF-MCNC: 6.6 MG/DL — HIGH
IGG FLD-MCNC: 2053 MG/DL — HIGH (ref 610–1660)
IGG SYNTH RATE SER+CSF CALC-MRATE: -3.9 MG/DAY — SIGNIFICANT CHANGE UP
IGG/ALB CLEAR SER+CSF-RTO: 0.5 — SIGNIFICANT CHANGE UP
IGG/ALB CSF: 0.33 RATIO — HIGH
IGG/ALB SER: 0.66 RATIO — SIGNIFICANT CHANGE UP
IMM GRANULOCYTES NFR BLD AUTO: 0.4 % — SIGNIFICANT CHANGE UP (ref 0–1.5)
LYMPHOCYTES # BLD AUTO: 1.14 K/UL — SIGNIFICANT CHANGE UP (ref 1–3.3)
LYMPHOCYTES # BLD AUTO: 48.7 % — HIGH (ref 13–44)
MAGNESIUM SERPL-MCNC: 2.1 MG/DL — SIGNIFICANT CHANGE UP (ref 1.6–2.6)
MCHC RBC-ENTMCNC: 27.7 PG — SIGNIFICANT CHANGE UP (ref 27–34)
MCHC RBC-ENTMCNC: 31.6 GM/DL — LOW (ref 32–36)
MCV RBC AUTO: 87.9 FL — SIGNIFICANT CHANGE UP (ref 80–100)
MONOCYTES # BLD AUTO: 0.16 K/UL — SIGNIFICANT CHANGE UP (ref 0–0.9)
MONOCYTES NFR BLD AUTO: 6.8 % — SIGNIFICANT CHANGE UP (ref 2–14)
NEUTROPHILS # BLD AUTO: 1.02 K/UL — LOW (ref 1.8–7.4)
NEUTROPHILS NFR BLD AUTO: 43.7 % — SIGNIFICANT CHANGE UP (ref 43–77)
NRBC # BLD: 0 /100 WBCS — SIGNIFICANT CHANGE UP (ref 0–0)
PHOSPHATE SERPL-MCNC: 3.7 MG/DL — SIGNIFICANT CHANGE UP (ref 2.5–4.5)
PLATELET # BLD AUTO: 103 K/UL — LOW (ref 150–400)
POTASSIUM SERPL-MCNC: 4.1 MMOL/L — SIGNIFICANT CHANGE UP (ref 3.5–5.3)
POTASSIUM SERPL-SCNC: 4.1 MMOL/L — SIGNIFICANT CHANGE UP (ref 3.5–5.3)
PROT SERPL-MCNC: 7.1 G/DL — SIGNIFICANT CHANGE UP (ref 6–8.3)
RBC # BLD: 3.21 M/UL — LOW (ref 3.8–5.2)
RBC # FLD: 15.6 % — HIGH (ref 10.3–14.5)
SODIUM SERPL-SCNC: 140 MMOL/L — SIGNIFICANT CHANGE UP (ref 135–145)
VDRL CSF-TITR: NEGATIVE — SIGNIFICANT CHANGE UP
WBC # BLD: 2.34 K/UL — LOW (ref 3.8–10.5)
WBC # FLD AUTO: 2.34 K/UL — LOW (ref 3.8–10.5)

## 2019-10-04 PROCEDURE — 99232 SBSQ HOSP IP/OBS MODERATE 35: CPT | Mod: GC

## 2019-10-04 RX ORDER — ACETAMINOPHEN 500 MG
1000 TABLET ORAL ONCE
Refills: 0 | Status: COMPLETED | OUTPATIENT
Start: 2019-10-04 | End: 2019-10-04

## 2019-10-04 RX ADMIN — Medication 650 MILLIGRAM(S): at 11:51

## 2019-10-04 RX ADMIN — CEFTRIAXONE 100 MILLIGRAM(S): 500 INJECTION, POWDER, FOR SOLUTION INTRAMUSCULAR; INTRAVENOUS at 17:48

## 2019-10-04 RX ADMIN — Medication 650 MILLIGRAM(S): at 22:12

## 2019-10-04 RX ADMIN — Medication 100 MICROGRAM(S): at 05:59

## 2019-10-04 RX ADMIN — Medication 60 MILLIGRAM(S): at 05:59

## 2019-10-04 RX ADMIN — Medication 650 MILLIGRAM(S): at 23:16

## 2019-10-04 RX ADMIN — Medication 650 MILLIGRAM(S): at 12:51

## 2019-10-04 NOTE — PROGRESS NOTE ADULT - ASSESSMENT
1.	S/p fevers - likely from lupus flare  2.	Pancytopenia - improving  ·	cont rocephin 1gm IV daily  D4, dc this tomorrow am  ·	will monitor for now

## 2019-10-04 NOTE — PROGRESS NOTE ADULT - SUBJECTIVE AND OBJECTIVE BOX
PGY 3 Note discussed with primary attending    Patient is a 24y old  Female who presents with a chief complaint of Fever, headache and facial swelling (03 Oct 2019 21:35)    INTERVAL HPI/OVERNIGHT EVENTS: Patient seen and examined at bedside with no new complaints - mild HA, pancytopenia improving, pending DAVID and no F x 24 hours    MEDICATIONS  (STANDING):  cefTRIAXone   IVPB 1000 milliGRAM(s) IV Intermittent every 24 hours  influenza   Vaccine 0.5 milliLiter(s) IntraMuscular once  lactated ringers. 1000 milliLiter(s) (100 mL/Hr) IV Continuous <Continuous>  levothyroxine 100 MICROGram(s) Oral <User Schedule>  predniSONE   Tablet 60 milliGRAM(s) Oral daily    MEDICATIONS  (PRN):  acetaminophen   Tablet .. 650 milliGRAM(s) Oral every 6 hours PRN Temp greater or equal to 38C (100.4F), Mild Pain (1 - 3)      __________________________________________________  REVIEW OF SYSTEMS: + HA  RESPIRATORY: No cough; No shortness of breath  CARDIOVASCULAR: No chest pain, no palpitations  GASTROINTESTINAL: No pain. No nausea or vomiting; No diarrhea       Vital Signs Last 24 Hrs  T(C): 36.9 (04 Oct 2019 06:26), Max: 37.1 (03 Oct 2019 22:00)  T(F): 98.4 (04 Oct 2019 06:26), Max: 98.8 (03 Oct 2019 22:00)  HR: 79 (04 Oct 2019 06:26) (79 - 102)  BP: 95/66 (04 Oct 2019 06:26) (95/66 - 108/70)  BP(mean): --  RR: 16 (04 Oct 2019 06:26) (16 - 20)  SpO2: 100% (04 Oct 2019 06:26) (100% - 100%)    ________________________________________________  PHYSICAL EXAM:  GENERAL: NAD young  female   HEENT: Normocephalic;  conjunctivae and sclerae clear; left molar inflammation    NECK : supple  CHEST/LUNG: Clear to auscultation bilaterally with good air entry   HEART: S1 S2  regular; no murmurs, gallops or rubs  ABDOMEN: Soft, Nontender, Nondistended; Bowel sounds present  EXTREMITIES: no cyanosis; no edema; no calf tenderness, tattoos  SKIN: warm and dry; no rash  NERVOUS SYSTEM:  Awake and alert x 3  ____________________________________________  LABS:                        8.9    2.34  )-----------( 103      ( 04 Oct 2019 07:32 )             28.2     10-04    140  |  107  |  8   ----------------------------<  88  4.1   |  27  |  0.62    Ca    8.5      04 Oct 2019 07:32  Phos  3.7     10-04  Mg     2.1     10-04    TPro  7.1  /  Alb  2.7<L>  /  TBili  0.3  /  DBili  x   /  AST  47<H>  /  ALT  47  /  AlkPhos  53  10-04    PT/INR - ( 02 Oct 2019 12:36 )   PT: 11.0 sec;   INR: 0.99 ratio         PTT - ( 02 Oct 2019 12:36 )  PTT:28.2 sec    CAPILLARY BLOOD GLUCOSE    RADIOLOGY & ADDITIONAL TESTS:    Imaging Personally Reviewed:  YES    Consultant(s) Notes Reviewed:   YES    Care Discussed with Consultants : YES    Plan of care was discussed with patient and /or primary care giver; all questions and concerns were addressed and care was aligned with patient's wishes.

## 2019-10-04 NOTE — PROGRESS NOTE ADULT - SUBJECTIVE AND OBJECTIVE BOX
24y Female is under our care for fevers and headaches, history of SLE. Patient was found to be sitting on chair, appears drowsy is c/o posterior h/a's again.  CSF culture is with no growth.  DAVID returned as 1:1280 so fevers were likely from lupus flare. Has not had any fevers for past 24 hours and pancytopenia is improving.      REVIEW OF SYSTEMS:  [  ] Not able to illicit  General: no fevers no malaise  Chest: no cough no sob  GI: no nvd  : no urinary sxs   Skin: no rashes  Musculoskeletal: no trauma no LBP  Neuro: +persistent ha's  no dizziness     MEDS:  cefTRIAXone   IVPB 1000 milliGRAM(s) IV Intermittent every 24 hours    ALLERGIES: Allergies    No Known Allergies    Intolerances      VITALS:  Vital Signs Last 24 Hrs  T(C): 36.8 (04 Oct 2019 14:26), Max: 37.1 (03 Oct 2019 22:00)  T(F): 98.3 (04 Oct 2019 14:26), Max: 98.8 (03 Oct 2019 22:00)  HR: 101 (04 Oct 2019 14:26) (79 - 102)  BP: 105/60 (04 Oct 2019 14:26) (95/66 - 105/60)  BP(mean): --  RR: 16 (04 Oct 2019 14:26) (16 - 16)  SpO2: 100% (04 Oct 2019 14:26) (100% - 100%)      PHYSICAL EXAM:  HEENT: n/a  Neck: supple no LN's  Respiratory: lungs clear no rales  Cardiovascular: S1 S2 reg no murmurs  Gastrointestinal: +BS with soft, nondistended abdomen; nontender  Extremities: no edema   Skin: no rashes  Ortho: no jt swelling  Neuro: aler and awake, but appears more drowsy      LABS/DIAGNOSTIC TESTS:                         8.9    2.34  )-----------( 103      ( 04 Oct 2019 07:32 )             28.2   WBC Count: 2.34 K/uL (10-04 @ 07:32)  WBC Count: 1.25 K/uL (10-03 @ 08:23)  WBC Count: 1.58 K/uL (10-02 @ 06:20)  WBC Count: 1.72 K/uL (10-01 @ 08:20)  WBC Count: 1.68 K/uL (09-30 @ 17:17)    10-04    140  |  107  |  8   ----------------------------<  88  4.1   |  27  |  0.62    Ca    8.5      04 Oct 2019 07:32  Phos  3.7     10-04  Mg     2.1     10-04    TPro  7.1  /  Alb  2.7<L>  /  TBili  0.3  /  DBili  x   /  AST  47<H>  /  ALT  47  /  AlkPhos  53  10-04       Anti-Nuclear Antibody (10.02.19 @ 10:20)    Anti Nuclear Factor Titer: 1:1280    DAVID Pattern: Speckled          CULTURES:   .Urine  10-01 @ 14:57   <10,000 CFU/mL Normal Urogenital Iris  --  --      .Blood  10-01 @ 01:06   No growth to date.  --  --      .Blood  10-01 @ 01:04   No growth to date.  --  --        RADIOLOGY:   No new studies

## 2019-10-04 NOTE — PROGRESS NOTE ADULT - PROBLEM SELECTOR PROBLEM 1
Pancytopenia
Systemic lupus erythematosus
Systemic lupus erythematosus
Fever due to unspecified condition
Fever due to unspecified condition
Pancytopenia
Pancytopenia

## 2019-10-04 NOTE — PROGRESS NOTE ADULT - PROBLEM SELECTOR PLAN 4
TSH of 12.7  Low Free T3, T4  Started Levothyroxine 100 mcg  F/u Anti Thyroid Ab  Monitor TSH in 4-6 weeks

## 2019-10-04 NOTE — PROGRESS NOTE ADULT - PROBLEM SELECTOR PLAN 3
History of SLE w/ low C3 Complement levels  - Normal Brain MRI and spinal tap significant for Lymphocytosis  - Headache showing mild improvement  ***Pending IgG  Primary Rheumatologist Dr Blount [phone]

## 2019-10-04 NOTE — PROGRESS NOTE ADULT - ATTENDING COMMENTS
Patient seen/evaluated at bedside 10/4/19. I agree with the resident progress note/outlined plan of care. My independent findings and conclusions are documented.    Reports HA essentially resolved. No nausea/vomiting/ fevers/chills.  Had 3 episodes of loose watery stool today.     vital reviewed  AOx3 NAD, non toxic appearing  no nuchal rigidity  S1S2 RRR  CTAB/l no accessory muscle use  soft, NT, ND, + BS  no LE edema/cyanosis/clubbing  negative kernig's/brudzinsky's signs    1: 12[address] DAVID, speckled    influenza negative  MRI brain: negative    1. fever   2. headache  3. suspected SLE flare status post LP  4. pancytopenia  5. likely autoimmune hypothyroidism  6. diarrhea    no findings to suggest focal source of infection. C3 slightly lowered  empiric ceftriaxone discontinued  placed on empiric higher dose steroids at 60mg po daily. Will discharge on high dose steroids to be tapered by outpt provider  follow up full CSF analysis, no evidence of acute infectious process. SLE CSF analysis pending  DAVID with high titer levels  rheumatology consult, Dr Melo input noted, resumption of biologic immunosuppressant agents planned as outpt  pancytopenia likely related to underlying SLE but will f/u with hematology oncology  thyroglobulin level noted elevated in the 200s  diarrhea x 3 loose watery stools, isolate, c diff x 3 samples  discharge pending resolution of diarrhea, c diff testing

## 2019-10-04 NOTE — PROGRESS NOTE ADULT - PROBLEM SELECTOR PLAN 1
Improving on high dose steroids 60 mg since x 10/3  - Monitor CBC daily  Plan discussed with Dr Melo to start immunosuppressives after discharge

## 2019-10-04 NOTE — PROGRESS NOTE ADULT - SUBJECTIVE AND OBJECTIVE BOX
has some headache and some pain at knees  no fever or chills  on prednisone  has some dry cough and sneezing    MEDICATIONS  (STANDING):  cefTRIAXone   IVPB 1000 milliGRAM(s) IV Intermittent every 24 hours  influenza   Vaccine 0.5 milliLiter(s) IntraMuscular once  lactated ringers. 1000 milliLiter(s) (100 mL/Hr) IV Continuous <Continuous>  levothyroxine 100 MICROGram(s) Oral <User Schedule>  predniSONE   Tablet 60 milliGRAM(s) Oral daily    MEDICATIONS  (PRN):  acetaminophen   Tablet .. 650 milliGRAM(s) Oral every 6 hours PRN Temp greater or equal to 38C (100.4F), Mild Pain (1 - 3)      Allergies    No Known Allergies    Intolerances        Vital Signs Last 24 Hrs  T(C): 36.9 (04 Oct 2019 06:26), Max: 37.1 (03 Oct 2019 22:00)  T(F): 98.4 (04 Oct 2019 06:26), Max: 98.8 (03 Oct 2019 22:00)  HR: 79 (04 Oct 2019 06:26) (79 - 102)  BP: 95/66 (04 Oct 2019 06:26) (95/66 - 108/70)  BP(mean): --  RR: 16 (04 Oct 2019 06:26) (16 - 20)  SpO2: 100% (04 Oct 2019 06:26) (100% - 100%)    PHYSICAL EXAM  General: adult in NAD  HEENT: clear oropharynx, anicteric sclera, pink conjunctiva  Neck: supple  CV: normal S1/S2 with no murmur rubs or gallops  Lungs: positive air movement b/l ant lungs,clear to auscultation, no wheezes, no rales  Abdomen: soft non-tender non-distended, no hepatosplenomegaly  Ext: no clubbing cyanosis or edema  Skin: no rashes and no petechiae  Neuro: alert and oriented X 4, no focal deficits  LABS:                          8.9    2.34  )-----------( 103      ( 04 Oct 2019 07:32 )             28.2         Mean Cell Volume : 87.9 fl  Mean Cell Hemoglobin : 27.7 pg  Mean Cell Hemoglobin Concentration : 31.6 gm/dL  Auto Neutrophil # : 1.02 K/uL  Auto Lymphocyte # : 1.14 K/uL  Auto Monocyte # : 0.16 K/uL  Auto Eosinophil # : 0.00 K/uL  Auto Basophil # : 0.01 K/uL  Auto Neutrophil % : 43.7 %  Auto Lymphocyte % : 48.7 %  Auto Monocyte % : 6.8 %  Auto Eosinophil % : 0.0 %  Auto Basophil % : 0.4 %    Serial CBC  Hematocrit 28.2  Hemoglobin 8.9  Plat 103  RBC 3.21  WBC 2.34  Serial CBC  Hematocrit 29.7  Hemoglobin 9.5  Plat 108  RBC 3.46  WBC 1.25  Serial CBC  Hematocrit 30.4  Hemoglobin 9.7  Plat 115  RBC 3.56  WBC 1.58  Serial CBC  Hematocrit 28.8  Hemoglobin 9.2  Plat 79  RBC 3.33  WBC 1.72  Serial CBC  Hematocrit 30.1  Hemoglobin 9.8  Plat 85  RBC 3.45  WBC 1.68    10-04    140  |  107  |  8   ----------------------------<  88  4.1   |  27  |  0.62    Ca    8.5      04 Oct 2019 07:32  Phos  3.7     10-04  Mg     2.1     10-04    TPro  7.1  /  Alb  2.7<L>  /  TBili  0.3  /  DBili  x   /  AST  47<H>  /  ALT  47  /  AlkPhos  53  10-04      PT/INR - ( 02 Oct 2019 12:36 )   PT: 11.0 sec;   INR: 0.99 ratio         PTT - ( 02 Oct 2019 12:36 )  PTT:28.2 sec    Vitamin B12, Serum: 419 pg/mL (10-01 @ 14:59)            BLOOD SMEAR INTERPRETATION:       RADIOLOGY & ADDITIONAL STUDIES:

## 2019-10-04 NOTE — PROGRESS NOTE ADULT - PROBLEM SELECTOR PLAN 5
Holding DVT prophylaxis due to thrombocytopenia
Holding DVT prophylaxis due to thrombocytopenia
Holding DT PPx 2/2 thrombocytopenia
Holding DVT prophylaxis due to thrombocytopenia

## 2019-10-04 NOTE — PROGRESS NOTE ADULT - ASSESSMENT
24 year old female PMH Pancytopenia, SLE (Prednisone 20 daily) w/ Neuropsychiatric features p/w fever and HA z 10 days - admitted for further evaluation and management

## 2019-10-04 NOTE — PROGRESS NOTE ADULT - PROBLEM SELECTOR PLAN 2
Afebrile x 24 hours in setting of pancytopenia  - Normal LP studies and cultures  - Empiric Ceftriaxone until DAVID results return

## 2019-10-05 ENCOUNTER — TRANSCRIPTION ENCOUNTER (OUTPATIENT)
Age: 24
End: 2019-10-05

## 2019-10-05 VITALS
OXYGEN SATURATION: 100 % | SYSTOLIC BLOOD PRESSURE: 112 MMHG | TEMPERATURE: 98 F | DIASTOLIC BLOOD PRESSURE: 69 MMHG | RESPIRATION RATE: 18 BRPM | HEART RATE: 100 BPM

## 2019-10-05 LAB
ANION GAP SERPL CALC-SCNC: 7 MMOL/L — SIGNIFICANT CHANGE UP (ref 5–17)
B BURGDOR DNA SPEC QL NAA+PROBE: NEGATIVE — SIGNIFICANT CHANGE UP
BASOPHILS # BLD AUTO: 0 K/UL — SIGNIFICANT CHANGE UP (ref 0–0.2)
BASOPHILS NFR BLD AUTO: 0 % — SIGNIFICANT CHANGE UP (ref 0–2)
BUN SERPL-MCNC: 12 MG/DL — SIGNIFICANT CHANGE UP (ref 7–18)
C DIFF BY PCR RESULT: SIGNIFICANT CHANGE UP
C DIFF TOX GENS STL QL NAA+PROBE: SIGNIFICANT CHANGE UP
CALCIUM SERPL-MCNC: 9 MG/DL — SIGNIFICANT CHANGE UP (ref 8.4–10.5)
CHLORIDE SERPL-SCNC: 105 MMOL/L — SIGNIFICANT CHANGE UP (ref 96–108)
CO2 SERPL-SCNC: 28 MMOL/L — SIGNIFICANT CHANGE UP (ref 22–31)
CREAT SERPL-MCNC: 0.66 MG/DL — SIGNIFICANT CHANGE UP (ref 0.5–1.3)
CULTURE RESULTS: NO GROWTH — SIGNIFICANT CHANGE UP
EOSINOPHIL # BLD AUTO: 0 K/UL — SIGNIFICANT CHANGE UP (ref 0–0.5)
EOSINOPHIL NFR BLD AUTO: 0 % — SIGNIFICANT CHANGE UP (ref 0–6)
GLUCOSE SERPL-MCNC: 88 MG/DL — SIGNIFICANT CHANGE UP (ref 70–99)
HCT VFR BLD CALC: 30 % — LOW (ref 34.5–45)
HGB BLD-MCNC: 9.5 G/DL — LOW (ref 11.5–15.5)
IMM GRANULOCYTES NFR BLD AUTO: 0.7 % — SIGNIFICANT CHANGE UP (ref 0–1.5)
LYMPHOCYTES # BLD AUTO: 1.27 K/UL — SIGNIFICANT CHANGE UP (ref 1–3.3)
LYMPHOCYTES # BLD AUTO: 45.7 % — HIGH (ref 13–44)
MCHC RBC-ENTMCNC: 27.8 PG — SIGNIFICANT CHANGE UP (ref 27–34)
MCHC RBC-ENTMCNC: 31.7 GM/DL — LOW (ref 32–36)
MCV RBC AUTO: 87.7 FL — SIGNIFICANT CHANGE UP (ref 80–100)
MONOCYTES # BLD AUTO: 0.39 K/UL — SIGNIFICANT CHANGE UP (ref 0–0.9)
MONOCYTES NFR BLD AUTO: 14 % — SIGNIFICANT CHANGE UP (ref 2–14)
NEUTROPHILS # BLD AUTO: 1.1 K/UL — LOW (ref 1.8–7.4)
NEUTROPHILS NFR BLD AUTO: 39.6 % — LOW (ref 43–77)
NRBC # BLD: 0 /100 WBCS — SIGNIFICANT CHANGE UP (ref 0–0)
PLATELET # BLD AUTO: 127 K/UL — LOW (ref 150–400)
POTASSIUM SERPL-MCNC: 3.3 MMOL/L — LOW (ref 3.5–5.3)
POTASSIUM SERPL-SCNC: 3.3 MMOL/L — LOW (ref 3.5–5.3)
RBC # BLD: 3.42 M/UL — LOW (ref 3.8–5.2)
RBC # FLD: 15.7 % — HIGH (ref 10.3–14.5)
SODIUM SERPL-SCNC: 140 MMOL/L — SIGNIFICANT CHANGE UP (ref 135–145)
SPECIMEN SOURCE: SIGNIFICANT CHANGE UP
WBC # BLD: 2.78 K/UL — LOW (ref 3.8–10.5)
WBC # FLD AUTO: 2.78 K/UL — LOW (ref 3.8–10.5)

## 2019-10-05 PROCEDURE — 86592 SYPHILIS TEST NON-TREP QUAL: CPT

## 2019-10-05 PROCEDURE — 87040 BLOOD CULTURE FOR BACTERIA: CPT

## 2019-10-05 PROCEDURE — 99285 EMERGENCY DEPT VISIT HI MDM: CPT | Mod: 25

## 2019-10-05 PROCEDURE — 86800 THYROGLOBULIN ANTIBODY: CPT

## 2019-10-05 PROCEDURE — 87493 C DIFF AMPLIFIED PROBE: CPT

## 2019-10-05 PROCEDURE — 99231 SBSQ HOSP IP/OBS SF/LOW 25: CPT

## 2019-10-05 PROCEDURE — 84157 ASSAY OF PROTEIN OTHER: CPT

## 2019-10-05 PROCEDURE — 96374 THER/PROPH/DIAG INJ IV PUSH: CPT | Mod: XU

## 2019-10-05 PROCEDURE — 83735 ASSAY OF MAGNESIUM: CPT

## 2019-10-05 PROCEDURE — 71046 X-RAY EXAM CHEST 2 VIEWS: CPT

## 2019-10-05 PROCEDURE — 70552 MRI BRAIN STEM W/DYE: CPT

## 2019-10-05 PROCEDURE — 86225 DNA ANTIBODY NATIVE: CPT

## 2019-10-05 PROCEDURE — 84100 ASSAY OF PHOSPHORUS: CPT

## 2019-10-05 PROCEDURE — 82945 GLUCOSE OTHER FLUID: CPT

## 2019-10-05 PROCEDURE — 70487 CT MAXILLOFACIAL W/DYE: CPT

## 2019-10-05 PROCEDURE — 80053 COMPREHEN METABOLIC PANEL: CPT

## 2019-10-05 PROCEDURE — 87086 URINE CULTURE/COLONY COUNT: CPT

## 2019-10-05 PROCEDURE — 84145 PROCALCITONIN (PCT): CPT

## 2019-10-05 PROCEDURE — 87205 SMEAR GRAM STAIN: CPT

## 2019-10-05 PROCEDURE — 82306 VITAMIN D 25 HYDROXY: CPT

## 2019-10-05 PROCEDURE — 87798 DETECT AGENT NOS DNA AMP: CPT

## 2019-10-05 PROCEDURE — 87449 NOS EACH ORGANISM AG IA: CPT

## 2019-10-05 PROCEDURE — 80048 BASIC METABOLIC PNL TOTAL CA: CPT

## 2019-10-05 PROCEDURE — 86431 RHEUMATOID FACTOR QUANT: CPT

## 2019-10-05 PROCEDURE — 87102 FUNGUS ISOLATION CULTURE: CPT

## 2019-10-05 PROCEDURE — 83615 LACTATE (LD) (LDH) ENZYME: CPT

## 2019-10-05 PROCEDURE — 86403 PARTICLE AGGLUT ANTBDY SCRN: CPT

## 2019-10-05 PROCEDURE — 84443 ASSAY THYROID STIM HORMONE: CPT

## 2019-10-05 PROCEDURE — 80074 ACUTE HEPATITIS PANEL: CPT

## 2019-10-05 PROCEDURE — 85027 COMPLETE CBC AUTOMATED: CPT

## 2019-10-05 PROCEDURE — 84481 FREE ASSAY (FT-3): CPT

## 2019-10-05 PROCEDURE — 82607 VITAMIN B-12: CPT

## 2019-10-05 PROCEDURE — 87389 HIV-1 AG W/HIV-1&-2 AB AG IA: CPT

## 2019-10-05 PROCEDURE — 85610 PROTHROMBIN TIME: CPT

## 2019-10-05 PROCEDURE — 81003 URINALYSIS AUTO W/O SCOPE: CPT

## 2019-10-05 PROCEDURE — 87631 RESP VIRUS 3-5 TARGETS: CPT

## 2019-10-05 PROCEDURE — 86140 C-REACTIVE PROTEIN: CPT

## 2019-10-05 PROCEDURE — 85652 RBC SED RATE AUTOMATED: CPT

## 2019-10-05 PROCEDURE — 88108 CYTOPATH CONCENTRATE TECH: CPT

## 2019-10-05 PROCEDURE — 86480 TB TEST CELL IMMUN MEASURE: CPT

## 2019-10-05 PROCEDURE — 88184 FLOWCYTOMETRY/ TC 1 MARKER: CPT

## 2019-10-05 PROCEDURE — 36415 COLL VENOUS BLD VENIPUNCTURE: CPT

## 2019-10-05 PROCEDURE — 86160 COMPLEMENT ANTIGEN: CPT

## 2019-10-05 PROCEDURE — 85730 THROMBOPLASTIN TIME PARTIAL: CPT

## 2019-10-05 PROCEDURE — 88185 FLOWCYTOMETRY/TC ADD-ON: CPT

## 2019-10-05 PROCEDURE — 87070 CULTURE OTHR SPECIMN AEROBIC: CPT

## 2019-10-05 PROCEDURE — 87476 LYME DIS DNA AMP PROBE: CPT

## 2019-10-05 PROCEDURE — 81025 URINE PREGNANCY TEST: CPT

## 2019-10-05 PROCEDURE — 70450 CT HEAD/BRAIN W/O DYE: CPT

## 2019-10-05 PROCEDURE — 89051 BODY FLUID CELL COUNT: CPT

## 2019-10-05 PROCEDURE — 86038 ANTINUCLEAR ANTIBODIES: CPT

## 2019-10-05 PROCEDURE — 87483 CNS DNA AMP PROBE TYPE 12-25: CPT

## 2019-10-05 PROCEDURE — 84439 ASSAY OF FREE THYROXINE: CPT

## 2019-10-05 RX ORDER — LEVOTHYROXINE SODIUM 125 MCG
1 TABLET ORAL
Qty: 0 | Refills: 0 | DISCHARGE
Start: 2019-10-05

## 2019-10-05 RX ORDER — LEVOTHYROXINE SODIUM 125 MCG
1 TABLET ORAL
Qty: 30 | Refills: 0
Start: 2019-10-05 | End: 2019-11-03

## 2019-10-05 RX ORDER — POTASSIUM CHLORIDE 20 MEQ
40 PACKET (EA) ORAL ONCE
Refills: 0 | Status: COMPLETED | OUTPATIENT
Start: 2019-10-05 | End: 2019-10-05

## 2019-10-05 RX ORDER — LEVOTHYROXINE SODIUM 125 MCG
25 TABLET ORAL DAILY
Refills: 0 | Status: DISCONTINUED | OUTPATIENT
Start: 2019-10-05 | End: 2019-10-05

## 2019-10-05 RX ORDER — LOPERAMIDE HCL 2 MG
2 TABLET ORAL ONCE
Refills: 0 | Status: COMPLETED | OUTPATIENT
Start: 2019-10-05 | End: 2019-10-05

## 2019-10-05 RX ADMIN — Medication 2 MILLIGRAM(S): at 17:55

## 2019-10-05 RX ADMIN — Medication 650 MILLIGRAM(S): at 05:50

## 2019-10-05 RX ADMIN — Medication 100 MICROGRAM(S): at 05:36

## 2019-10-05 RX ADMIN — Medication 60 MILLIGRAM(S): at 05:35

## 2019-10-05 RX ADMIN — Medication 40 MILLIEQUIVALENT(S): at 17:56

## 2019-10-05 NOTE — PROGRESS NOTE ADULT - REASON FOR ADMISSION
Fever, headache and facial swelling
Fever, headache and facial swelling
fever, headache, and facial swelling
Fever, headache and facial swelling

## 2019-10-05 NOTE — PROGRESS NOTE ADULT - SUBJECTIVE AND OBJECTIVE BOX
Patient is a 24y old  Female who presents with a chief complaint of Fever, headache and facial swelling (05 Oct 2019 11:34)      INTERVAL HPI/OVERNIGHT EVENTS: no new complaints    MEDICATIONS  (STANDING):  influenza   Vaccine 0.5 milliLiter(s) IntraMuscular once  lactated ringers. 1000 milliLiter(s) (100 mL/Hr) IV Continuous <Continuous>  levothyroxine 25 MICROGram(s) Oral daily  loperamide 2 milliGRAM(s) Oral once  potassium chloride    Tablet ER 40 milliEquivalent(s) Oral once  predniSONE   Tablet 60 milliGRAM(s) Oral daily    MEDICATIONS  (PRN):  acetaminophen   Tablet .. 650 milliGRAM(s) Oral every 6 hours PRN Temp greater or equal to 38C (100.4F), Mild Pain (1 - 3)      __________________________________________________  REVIEW OF SYSTEMS:    CONSTITUTIONAL: No fever,   EYES: no acute visual disturbances  NECK: No pain or stiffness  RESPIRATORY: No cough; No shortness of breath  CARDIOVASCULAR: No chest pain, no palpitations  GASTROINTESTINAL: No pain. No nausea or vomiting; No diarrhea   NEUROLOGICAL: No headache or numbness, no tremors  MUSCULOSKELETAL: No joint pain, no muscle pain  GENITOURINARY: no dysuria, no frequency, no hesitancy  PSYCHIATRY: no depression , no anxiety  ALL OTHER  ROS negative        Vital Signs Last 24 Hrs  T(C): 36.4 (05 Oct 2019 13:42), Max: 37.2 (04 Oct 2019 20:39)  T(F): 97.5 (05 Oct 2019 13:42), Max: 98.9 (04 Oct 2019 20:39)  HR: 67 (05 Oct 2019 13:42) (60 - 95)  BP: 105/54 (05 Oct 2019 13:42) (90/58 - 105/54)  BP(mean): --  RR: 17 (05 Oct 2019 13:42) (16 - 17)  SpO2: 100% (05 Oct 2019 13:42) (100% - 100%)    ________________________________________________  PHYSICAL EXAM:  GENERAL: NAD  HEENT: Normocephalic;  conjunctivae and sclerae clear; moist mucous membranes;   NECK : supple  CHEST/LUNG: Clear to auscultation bilaterally with good air entry   HEART: S1 S2  regular; no murmurs, gallops or rubs  ABDOMEN: Soft, Nontender, Nondistended; Bowel sounds present  EXTREMITIES: no cyanosis; no edema; no calf tenderness  SKIN: warm and dry; no rash  NERVOUS SYSTEM:  Awake and alert; Oriented  to place, person and time ; no new deficits    _________________________________________________  LABS:                        9.5    2.78  )-----------( 127      ( 05 Oct 2019 06:50 )             30.0     10-05    140  |  105  |  12  ----------------------------<  88  3.3<L>   |  28  |  0.66    Ca    9.0      05 Oct 2019 06:50  Phos  3.7     10-04  Mg     2.1     10-04    TPro  7.1  /  Alb  2.7<L>  /  TBili  0.3  /  DBili  x   /  AST  47<H>  /  ALT  47  /  AlkPhos  53  10-04        CAPILLARY BLOOD GLUCOSE            RADIOLOGY & ADDITIONAL TESTS:    Imaging Personally Reviewed:  YES/NO    Consultant(s) Notes Reviewed:   YES/ No    Care Discussed with Consultants :     Plan of care was discussed with patient and /or primary care giver; all questions and concerns were addressed and care was aligned with patient's wishes.

## 2019-10-05 NOTE — PROGRESS NOTE ADULT - RS GEN PE MLT RESP DETAILS PC
breath sounds equal/clear to auscultation bilaterally/no rales/no rhonchi/no wheezes/good air movement

## 2019-10-05 NOTE — PROGRESS NOTE ADULT - GASTROINTESTINAL DETAILS
no organomegaly/soft/nontender/no masses palpable/no distention/no guarding/no rebound tenderness/bowel sounds normal/no rigidity

## 2019-10-05 NOTE — PROGRESS NOTE ADULT - SUBJECTIVE AND OBJECTIVE BOX
24y Female who is afebrile but c/o headaches still , likely spinal headaches     Meds:  cefTRIAXone   IVPB 1000 milliGRAM(s) IV Intermittent every 24 hours    Allergies    No Known Allergies    Intolerances        VITALS:  Vital Signs Last 24 Hrs  T(C): 36.3 (05 Oct 2019 05:03), Max: 37.2 (04 Oct 2019 20:39)  T(F): 97.3 (05 Oct 2019 05:03), Max: 98.9 (04 Oct 2019 20:39)  HR: 60 (05 Oct 2019 05:03) (60 - 101)  BP: 90/58 (05 Oct 2019 05:03) (90/58 - 105/60)  BP(mean): --  RR: 16 (05 Oct 2019 05:03) (16 - 17)  SpO2: 100% (05 Oct 2019 05:03) (100% - 100%)    LABS/DIAGNOSTIC TESTS:                          9.5    2.78  )-----------( 127      ( 05 Oct 2019 06:50 )             30.0         10-05    140  |  105  |  12  ----------------------------<  88  3.3<L>   |  28  |  0.66    Ca    9.0      05 Oct 2019 06:50  Phos  3.7     10-04  Mg     2.1     10-04    TPro  7.1  /  Alb  2.7<L>  /  TBili  0.3  /  DBili  x   /  AST  47<H>  /  ALT  47  /  AlkPhos  53  10-04      LIVER FUNCTIONS - ( 04 Oct 2019 07:32 )  Alb: 2.7 g/dL / Pro: 7.1 g/dL / ALK PHOS: 53 U/L / ALT: 47 U/L DA / AST: 47 U/L / GGT: x             CULTURES: .CSF  10-03 @ 00:51   No growth  --    No polymorphonuclear cells seen  No organisms seen  by cytocentrifuge      .Urine  10-01 @ 14:57   <10,000 CFU/mL Normal Urogenital Iris  --  --      .Blood  10-01 @ 01:06   No growth to date.  --  --      .Blood  10-01 @ 01:04   No growth to date.  --  --            RADIOLOGY:      ROS:  [  ] UNABLE TO ELICIT 24y Female who is afebrile but c/o headaches still , likely spinal headaches ,     Meds:  cefTRIAXone   IVPB 1000 milliGRAM(s) IV Intermittent every 24 hours    Allergies    No Known Allergies    Intolerances        VITALS:  Vital Signs Last 24 Hrs  T(C): 36.3 (05 Oct 2019 05:03), Max: 37.2 (04 Oct 2019 20:39)  T(F): 97.3 (05 Oct 2019 05:03), Max: 98.9 (04 Oct 2019 20:39)  HR: 60 (05 Oct 2019 05:03) (60 - 101)  BP: 90/58 (05 Oct 2019 05:03) (90/58 - 105/60)  BP(mean): --  RR: 16 (05 Oct 2019 05:03) (16 - 17)  SpO2: 100% (05 Oct 2019 05:03) (100% - 100%)    LABS/DIAGNOSTIC TESTS:                          9.5    2.78  )-----------( 127      ( 05 Oct 2019 06:50 )             30.0         10-05    140  |  105  |  12  ----------------------------<  88  3.3<L>   |  28  |  0.66    Ca    9.0      05 Oct 2019 06:50  Phos  3.7     10-04  Mg     2.1     10-04    TPro  7.1  /  Alb  2.7<L>  /  TBili  0.3  /  DBili  x   /  AST  47<H>  /  ALT  47  /  AlkPhos  53  10-04      LIVER FUNCTIONS - ( 04 Oct 2019 07:32 )  Alb: 2.7 g/dL / Pro: 7.1 g/dL / ALK PHOS: 53 U/L / ALT: 47 U/L DA / AST: 47 U/L / GGT: x             CULTURES: .CSF  10-03 @ 00:51   No growth  --    No polymorphonuclear cells seen  No organisms seen  by cytocentrifuge      .Urine  10-01 @ 14:57   <10,000 CFU/mL Normal Urogenital Iris  --  --      .Blood  10-01 @ 01:06   No growth to date.  --  --      .Blood  10-01 @ 01:04   No growth to date.  --  --            RADIOLOGY:      ROS:  [  ] UNABLE TO ELICIT 24y Female who is afebrile but c/o headaches still , likely spinal headaches , no chills but has watery diarrhea since yesterday, it is not foul smelling , she had 4 episodes yesterday and 2 so far today, a stool for c.difficile is testing but results are pending.     Meds:  cefTRIAXone   IVPB 1000 milliGRAM(s) IV Intermittent every 24 hours    Allergies    No Known Allergies    Intolerances        VITALS:  Vital Signs Last 24 Hrs  T(C): 36.3 (05 Oct 2019 05:03), Max: 37.2 (04 Oct 2019 20:39)  T(F): 97.3 (05 Oct 2019 05:03), Max: 98.9 (04 Oct 2019 20:39)  HR: 60 (05 Oct 2019 05:03) (60 - 101)  BP: 90/58 (05 Oct 2019 05:03) (90/58 - 105/60)  BP(mean): --  RR: 16 (05 Oct 2019 05:03) (16 - 17)  SpO2: 100% (05 Oct 2019 05:03) (100% - 100%)    LABS/DIAGNOSTIC TESTS:                          9.5    2.78  )-----------( 127      ( 05 Oct 2019 06:50 )             30.0         10-05    140  |  105  |  12  ----------------------------<  88  3.3<L>   |  28  |  0.66    Ca    9.0      05 Oct 2019 06:50  Phos  3.7     10-04  Mg     2.1     10-04    TPro  7.1  /  Alb  2.7<L>  /  TBili  0.3  /  DBili  x   /  AST  47<H>  /  ALT  47  /  AlkPhos  53  10-04      LIVER FUNCTIONS - ( 04 Oct 2019 07:32 )  Alb: 2.7 g/dL / Pro: 7.1 g/dL / ALK PHOS: 53 U/L / ALT: 47 U/L DA / AST: 47 U/L / GGT: x             CULTURES: .CSF  10-03 @ 00:51   No growth  --    No polymorphonuclear cells seen  No organisms seen  by cytocentrifuge      .Urine  10-01 @ 14:57   <10,000 CFU/mL Normal Urogenital Iris  --  --      .Blood  10-01 @ 01:06   No growth to date.  --  --      .Blood  10-01 @ 01:04   No growth to date.  --  --            RADIOLOGY:      ROS:  [  ] UNABLE TO ELICIT

## 2019-10-05 NOTE — DISCHARGE NOTE NURSING/CASE MANAGEMENT/SOCIAL WORK - PATIENT PORTAL LINK FT
You can access the FollowMyHealth Patient Portal offered by [facility] by registering at the following website: [email or web address]. By joining [facility]’s FollowMyHealth portal, you will also be able to view your health information using other applications (apps) compatible with our system.

## 2019-10-06 LAB
CULTURE RESULTS: SIGNIFICANT CHANGE UP
CULTURE RESULTS: SIGNIFICANT CHANGE UP
SPECIMEN SOURCE: SIGNIFICANT CHANGE UP
SPECIMEN SOURCE: SIGNIFICANT CHANGE UP

## 2019-10-08 LAB
WNV RNA SPEC QL NAA+PROBE: SIGNIFICANT CHANGE UP
WNV RNA SPEC QL NAA+PROBE: SIGNIFICANT CHANGE UP

## 2019-11-02 LAB
CULTURE RESULTS: SIGNIFICANT CHANGE UP
SPECIMEN SOURCE: SIGNIFICANT CHANGE UP

## 2022-06-23 NOTE — ED ADULT NURSE NOTE - NS_SISCREENINGSR_GEN_ALL_ED
· Recommend taking prn Melatonin 5 mg at HS daily  · Sleep study test scheduled  · Continue oxygen nasal cannula at HS  · Reduce daytime sleep  · Recommend sleeping in bed with extra pillows to keep head slightly higher than rest of body  Negative

## 2023-04-11 NOTE — PATIENT PROFILE ADULT - LAST BOWEL MOVEMENT DATE
DISPLAY PLAN FREE TEXT DISPLAY PLAN FREE TEXT DISPLAY PLAN FREE TEXT DISPLAY PLAN FREE TEXT DISPLAY PLAN FREE TEXT DISPLAY PLAN FREE TEXT DISPLAY PLAN FREE TEXT DISPLAY PLAN FREE TEXT DISPLAY PLAN FREE TEXT DISPLAY PLAN FREE TEXT DISPLAY PLAN FREE TEXT 30-Sep-2019

## 2023-06-14 NOTE — PATIENT PROFILE ADULT - DO YOU FEEL UNSAFE AT HOME, WORK, OR SCHOOL?
Good nutrition is important when healing from an illness, injury, or surgery. Follow any nutrition recommendations given to you during your hospital stay. If you were given an oral nutrition supplement while in the hospital, continue to take this supplement at home. You can take it with meals, in-between meals, and/or before bedtime. These supplements can be purchased at most local grocery stores, pharmacies, and chain [employer]-stores. If you have any questions about your diet or nutrition, call the hospital and ask for the dietitian. Advance to regular diet as tolerated.
no

## 2023-08-24 ENCOUNTER — APPOINTMENT (OUTPATIENT)
Dept: ORTHOPEDIC SURGERY | Facility: CLINIC | Age: 28
End: 2023-08-24
Payer: COMMERCIAL

## 2023-08-24 VITALS — BODY MASS INDEX: 24.75 KG/M2 | WEIGHT: 145 LBS | HEIGHT: 64 IN

## 2023-08-24 PROBLEM — Z00.00 ENCOUNTER FOR PREVENTIVE HEALTH EXAMINATION: Status: ACTIVE | Noted: 2023-08-24

## 2023-08-24 PROBLEM — M32.9 SYSTEMIC LUPUS ERYTHEMATOSUS, UNSPECIFIED: Chronic | Status: ACTIVE | Noted: 2019-10-01

## 2023-08-24 PROBLEM — M32.9 SYSTEMIC LUPUS ERYTHEMATOSUS, UNSPECIFIED: Chronic | Status: ACTIVE | Noted: 2019-10-02

## 2023-08-24 PROCEDURE — 72100 X-RAY EXAM L-S SPINE 2/3 VWS: CPT

## 2023-08-24 PROCEDURE — 99204 OFFICE O/P NEW MOD 45 MIN: CPT

## 2023-08-24 PROCEDURE — 73523 X-RAY EXAM HIPS BI 5/> VIEWS: CPT

## 2023-08-24 NOTE — IMAGING
[de-identified] : LOWER EXTREMITY/ RIGHT HIP EXAM Standing pelvic alignment: Symmetric with no Trendelenburg Atrophy: none Ecchymosis/swelling: none  Range of Motion Hip: Flexion/extension/ER/IR     / EX 20/ ER 45/ IR 0 / AB 60 /AD 20 Impingement with flexion adduction and internal rotation: POS Contracture: none Snapping hip: negative Greater trochanter: no tenderness  Neurovascular Distal extremities: warm to touch Sensation to light touch: intact Muscle strength: 5/5  [id number] Xrays of the Right hip 3v were taken XR at [place] taken showing bilateral femoral head osteonecrosis

## 2023-08-24 NOTE — ASSESSMENT
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

## 2023-08-24 NOTE — HISTORY OF PRESENT ILLNESS
[10] : 10 [7] : 7 [Dull/Aching] : dull/aching [Sharp] : sharp [Frequent] : frequent [Leisure] : leisure [Sleep] : sleep [Meds] : meds [Standing] : standing [Walking] : walking [Stairs] : stairs [Lying in bed] : lying in bed [de-identified] : 08/24/23: Patient GINA GILLESPIE is 28 years old and is being evaluated for BOTH HIPS L<R. On long term prednisone for Lupus.   No pain radiating down the leg. No numbness. Taking tylenol and motrin.  [] : Post Surgical Visit: no [FreeTextEntry1] : Ivan Hips L<R [FreeTextEntry3] : 3+ months

## 2023-09-05 ENCOUNTER — TRANSCRIPTION ENCOUNTER (OUTPATIENT)
Age: 28
End: 2023-09-05

## 2023-09-05 ENCOUNTER — APPOINTMENT (OUTPATIENT)
Dept: MRI IMAGING | Facility: CLINIC | Age: 28
End: 2023-09-05
Payer: COMMERCIAL

## 2023-09-05 PROCEDURE — 73721 MRI JNT OF LWR EXTRE W/O DYE: CPT | Mod: RT

## 2023-09-12 ENCOUNTER — APPOINTMENT (OUTPATIENT)
Dept: ORTHOPEDIC SURGERY | Facility: CLINIC | Age: 28
End: 2023-09-12
Payer: COMMERCIAL

## 2023-09-12 DIAGNOSIS — M87.00 IDIOPATHIC ASEPTIC NECROSIS OF UNSPECIFIED BONE: ICD-10-CM

## 2023-09-12 PROCEDURE — 99214 OFFICE O/P EST MOD 30 MIN: CPT

## 2024-07-19 ENCOUNTER — APPOINTMENT (OUTPATIENT)
Dept: ORTHOPEDIC SURGERY | Facility: CLINIC | Age: 29
End: 2024-07-19

## 2024-07-19 ENCOUNTER — NON-APPOINTMENT (OUTPATIENT)
Age: 29
End: 2024-07-19

## 2024-07-29 ENCOUNTER — APPOINTMENT (OUTPATIENT)
Dept: ORTHOPEDIC SURGERY | Facility: CLINIC | Age: 29
End: 2024-07-29

## 2024-07-30 ENCOUNTER — APPOINTMENT (OUTPATIENT)
Dept: ORTHOPEDIC SURGERY | Facility: CLINIC | Age: 29
End: 2024-07-30

## 2024-08-13 NOTE — H&P ADULT - NSHPROSALLOTHERNEGRD_GEN_ALL_CORE
Short Stay Endoscopy History and Physical    PCP - Stephanie George MD    Procedure - Colonoscopy  ASA - per anesthesia  Mallampati - per anesthesia  History of Anesthesia problems - no  Family history Anesthesia problems - no   Plan of anesthesia - General    HPI:  This is a 67 y.o. female here for evaluation of : family history of colon cancer       ROS:  Constitutional: No fevers, chills, No weight loss  CV: No chest pain  Pulm: No cough, No shortness of breath  GI: see HPI  Derm: No rash    Medical History:  has a past medical history of Hypertension.    Surgical History:  has a past surgical history that includes hemorroidectomy; Breast biopsy; Hysterectomy; TONSILLECTOMY, ADENOIDECTOMY; and Knee arthroscopy (Bilateral).    Family History: family history includes Breast cancer in her paternal aunt and sister; Cancer in her paternal aunt; Cancer (age of onset: 38) in her sister; Colon cancer in her brother; Diabetes in her mother and sister; Hypertension in her father and mother.. Otherwise no colon cancer, inflammatory bowel disease, or GI malignancies.    Social History:  reports that she has never smoked. She has never used smokeless tobacco. She reports current alcohol use.    Review of patient's allergies indicates:   Allergen Reactions    Codeine      fainted       Medications:   Medications Prior to Admission   Medication Sig Dispense Refill Last Dose    amLODIPine (NORVASC) 10 MG tablet TAKE 1 TABLET EVERY DAY 90 tablet 3     calcium carbonate (OS-ANIKA) 600 mg calcium (1,500 mg) Tab Take by mouth.       cholecalciferol, vitamin D3, 5,000 unit Tab Take by mouth.       losartan (COZAAR) 100 MG tablet TAKE 1 TABLET EVERY DAY 90 tablet 3     multivitamin (THERAGRAN) per tablet take 1 tablet by oral route  every day with food            Physical Exam:    Vital Signs:   Vitals:    08/13/24 0645   BP: (!) 147/70   Pulse: (!) 57   Resp: 15   Temp: 98.1 °F (36.7 °C)       Gen: NAD, lying comfortably  HENT:  NCAT, oropharynx clear  Eyes: anicteric sclerae, EOMI grossly  Neck: supple, no visible masses/goiter  Cardiac: RRR  Lungs: non-labored breathing  Abd: soft, NT/ND, normoactive BS  Ext: no LE edema, warm, well perfused  Skin: skin intact on exposed body parts, no visible rashes, lesions  Neuro: A&Ox4, neuro exam grossly intact, moves all extremities  Psych: appropriate mood, affect        Labs:  Lab Results   Component Value Date    WBC 5.13 04/15/2024    HGB 12.3 04/15/2024    HCT 38.2 04/15/2024     04/15/2024    CHOL 180 04/15/2024    TRIG 60 04/15/2024    HDL 70 04/15/2024    ALT 13 04/15/2024    AST 30 04/15/2024     04/15/2024    K 4.1 04/15/2024     04/15/2024    CREATININE 0.9 04/15/2024    BUN 12 04/15/2024    CO2 25 04/15/2024    TSH 1.115 04/15/2024    HGBA1C 4.9 04/15/2024       Plan:  Colonoscopy for a family history of colon cancer    I have explained the risks and benefits of endoscopy procedures to the patient including but not limited to bleeding, perforation, infection, and death.  The patient was asked if they understand and allowed to ask any further questions to their satisfaction.    Karolyn Noe MD     All other review of systems negative, except as noted in HPI

## 2024-09-17 ENCOUNTER — LABORATORY RESULT (OUTPATIENT)
Age: 29
End: 2024-09-17

## 2024-09-17 ENCOUNTER — APPOINTMENT (OUTPATIENT)
Dept: RHEUMATOLOGY | Facility: CLINIC | Age: 29
End: 2024-09-17

## 2024-09-17 DIAGNOSIS — M32.9 SYSTEMIC LUPUS ERYTHEMATOSUS, UNSPECIFIED: ICD-10-CM

## 2024-09-17 NOTE — HISTORY OF PRESENT ILLNESS
[FreeTextEntry1] : 89 year old diagnosed with SLE in 2014 (age 19) when she presented with malar rash, joint pain and fatigue.  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  Seeing Dr Garcia at [facility].  She has had 2 flares this year, and placed on 20mg and tapered off; in March with rash on fingers and ears, and "boils" on her fingers-- saw a dermatologist--[facility], last month she developed a second flare and told of a flare with swelling of her sternum and of her left clavicle; her crp and esr were high.  Given a medrol pack and has been off steroids ~3 weeks.  Relates that she was taking her meds off and on; she has been off and on prednisone bursts "a few times a year" for many years. 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) Currently feeling well, no fever, rash, anorexia, weight loss, alopecia, lymphadenopathy, chills, joint pain/swelling/stiffness, oral ulcers, fatigue, dry eyes, chest pain, shortness of breath, cough, anosmia, ageusia, nausea, vomiting, diarrhea, abdominal pain.migraines   PMH: hypothyroid ~1.5 yrs   Meds:  MMF 3g, HCQ 400mg (last optho ~1year ago);  belimumab monthly (lst on 9/3);  levothyoxine   Allg: shellfish Surg: none SH: no tob, rare ETOH, occasional marijuana; a  in [facility]-- PCP is in [place] and she was referred FH:  2 brothers a paternal cousin with Lupus, no pregnancies Labs: 08/2024  SSA/SSB  1.8/-; Ribo P + 6.2 (nl<1); CRP 22.8, , BUN/Cr 10/0.8, alb 4.2, TP 7.1 AST/ALT: 13/7 9/3/14:  WBC 2.6 L 0.62, Hg 8,4 Hct 26.7, Plt 222 Fe 57, TIBC 28.1, Fe Sat 20

## 2024-09-17 NOTE — ASSESSMENT
[FreeTextEntry1] : 29 year old with 10 year history of lupus with history of lupus psychosis, and frequent flares (responding to low/moderate prednisone), but with poor adherence to meds.  She has a history of AVN.  Will check labs including aPLs in addition to heme work-up.  Will not change medications at the current time

## 2024-09-17 NOTE — PHYSICAL EXAM
[General Appearance - Alert] : alert [General Appearance - In No Acute Distress] : in no acute distress [General Appearance - Well Nourished] : well nourished [General Appearance - Well Developed] : well developed [Sclera] : the sclera and conjunctiva were normal [Extraocular Movements] : extraocular movements were intact [Examination Of The Oral Cavity] : the lips and gums were normal [Nasal Cavity] : the nasal mucosa and septum were normal [Neck Appearance] : the appearance of the neck was normal [Neck Cervical Mass (___cm)] : no neck mass was observed [Thyroid Diffuse Enlargement] : the thyroid was not enlarged [Exaggerated Use Of Accessory Muscles For Inspiration] : no accessory muscle use [Auscultation Breath Sounds / Voice Sounds] : lungs were clear to auscultation bilaterally [Heart Rate And Rhythm] : heart rate was normal and rhythm regular [Heart Sounds] : normal S1 and S2 [Heart Sounds Gallop] : no gallops [Murmurs] : no murmurs [Heart Sounds Pericardial Friction Rub] : no pericardial rub [Edema] : there was no peripheral edema [Bowel Sounds] : normal bowel sounds [Abdomen Soft] : soft [Abdomen Tenderness] : non-tender [Cervical Lymph Nodes Enlarged Posterior Bilaterally] : posterior cervical [Cervical Lymph Nodes Enlarged Anterior Bilaterally] : anterior cervical [Supraclavicular Lymph Nodes Enlarged Bilaterally] : supraclavicular [No CVA Tenderness] : no ~M costovertebral angle tenderness [No Spinal Tenderness] : no spinal tenderness [] : no rash [Skin Lesions] : no skin lesions [No Focal Deficits] : no focal deficits [FreeTextEntry1] : healed discoid right ear; no malar rash, periungal erythema, no livedo reticularis,

## 2024-09-17 NOTE — HISTORY OF PRESENT ILLNESS
[FreeTextEntry1] : 89 year old diagnosed with SLE in 2014 (age 19) when she presented with malar rash, joint pain and fatigue.  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  Seeing Dr Garcia at [facility].  She has had 2 flares this year, and placed on 20mg and tapered off; in March with rash on fingers and ears, and "boils" on her fingers-- saw a dermatologist--[facility], last month she developed a second flare and told of a flare with swelling of her sternum and of her left clavicle; her crp and esr were high.  Given a medrol pack and has been off steroids ~3 weeks.  Relates that she was taking her meds off and on; she has been off and on prednisone bursts "a few times a year" for many years. 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) Currently feeling well, no fever, rash, anorexia, weight loss, alopecia, lymphadenopathy, chills, joint pain/swelling/stiffness, oral ulcers, fatigue, dry eyes, chest pain, shortness of breath, cough, anosmia, ageusia, nausea, vomiting, diarrhea, abdominal pain.migraines   PMH: hypothyroid ~1.5 yrs   Meds:  MMF 3g, HCQ 400mg (last optho ~1year ago);  belimumab monthly (lst on 9/3);  levothyoxine   Allg: shellfish Surg: none SH: no tob, rare ETOH, occasional marijuana; a  in [facility]-- PCP is in [place] and she was referred FH:  2 brothers a paternal cousin with Lupus, no pregnancies Labs: 08/2024  SSA/SSB  1.8/-; Ribo P + 6.2 (nl<1); CRP 22.8, , BUN/Cr 10/0.8, alb 4.2, TP 7.1 AST/ALT: 13/7 9/3/14:  WBC 2.6 L 0.62, Hg 8,4 Hct 26.7, Plt 222 Fe 57, TIBC 28.1, Fe Sat 20

## 2024-09-18 LAB
ALBUMIN SERPL ELPH-MCNC: 4.6 G/DL
ALP BLD-CCNC: 74 U/L
ALT SERPL-CCNC: 10 U/L
ANION GAP SERPL CALC-SCNC: 17 MMOL/L
APPEARANCE: CLEAR
AST SERPL-CCNC: 18 U/L
BILIRUB SERPL-MCNC: 0.3 MG/DL
BILIRUBIN URINE: NEGATIVE
BLOOD URINE: NEGATIVE
BUN SERPL-MCNC: 11 MG/DL
C3 SERPL-MCNC: 104 MG/DL
C4 SERPL-MCNC: 18 MG/DL
CALCIUM SERPL-MCNC: 9.3 MG/DL
CHLORIDE SERPL-SCNC: 103 MMOL/L
CO2 SERPL-SCNC: 21 MMOL/L
COLOR: YELLOW
CONFIRM: 24.1 SEC
CREAT SERPL-MCNC: 0.57 MG/DL
CREAT SPEC-SCNC: 20 MG/DL
CREAT/PROT UR: 0.4 RATIO
DAT C3: NORMAL
DAT IGG-SP REAG RBC-IMP: ABNORMAL
DAT POLY: ABNORMAL
DRVVT IMM 1:2 NP PPP: NORMAL
DRVVT SCREEN TO CONFIRM RATIO: 0.85 RATIO
EGFR: 125 ML/MIN/1.73M2
ERYTHROCYTE [SEDIMENTATION RATE] IN BLOOD BY WESTERGREN METHOD: 75 MM/HR
GLUCOSE QUALITATIVE U: NEGATIVE MG/DL
GLUCOSE SERPL-MCNC: 66 MG/DL
HAPTOGLOB SERPL-MCNC: 132 MG/DL
KETONES URINE: NEGATIVE MG/DL
LEUKOCYTE ESTERASE URINE: NEGATIVE
NITRITE URINE: NEGATIVE
PH URINE: 7
POTASSIUM SERPL-SCNC: 5.4 MMOL/L
PROT SERPL-MCNC: 8.1 G/DL
PROT UR-MCNC: 9 MG/DL
PROTEIN URINE: NEGATIVE MG/DL
SCREEN DRVVT: 26.7 SEC
SODIUM SERPL-SCNC: 141 MMOL/L
SPECIFIC GRAVITY URINE: 1.01
UROBILINOGEN URINE: 0.2 MG/DL

## 2024-09-19 LAB
BASOPHILS # BLD AUTO: 0 K/UL
BASOPHILS NFR BLD AUTO: 0 %
DSDNA AB SER-ACNC: 7 IU/ML
EOSINOPHIL # BLD AUTO: 0 K/UL
EOSINOPHIL NFR BLD AUTO: 0 %
HCT VFR BLD CALC: 29.4 %
HGB BLD-MCNC: 9.2 G/DL
LYMPHOCYTES # BLD AUTO: 0.87 K/UL
LYMPHOCYTES NFR BLD AUTO: 35.1 %
MAN DIFF?: NORMAL
MCHC RBC-ENTMCNC: 27.3 PG
MCHC RBC-ENTMCNC: 31.3 GM/DL
MCV RBC AUTO: 87.2 FL
MONOCYTES # BLD AUTO: 0.31 K/UL
MONOCYTES NFR BLD AUTO: 12.3 %
NEUTROPHILS # BLD AUTO: 1.31 K/UL
NEUTROPHILS NFR BLD AUTO: 52.6 %
PLATELET # BLD AUTO: 197 K/UL
RBC # BLD: 3.37 M/UL
RBC # BLD: 3.37 M/UL
RBC # FLD: 18.2 %
RETICS # AUTO: 1.4 %
RETICS AGGREG/RBC NFR: 46.6 K/UL
WBC # FLD AUTO: 2.49 K/UL